# Patient Record
Sex: FEMALE | Race: WHITE | URBAN - METROPOLITAN AREA
[De-identification: names, ages, dates, MRNs, and addresses within clinical notes are randomized per-mention and may not be internally consistent; named-entity substitution may affect disease eponyms.]

---

## 2018-04-24 ENCOUNTER — OUTPATIENT (OUTPATIENT)
Dept: OUTPATIENT SERVICES | Facility: HOSPITAL | Age: 58
LOS: 1 days | End: 2018-04-24
Payer: MEDICAID

## 2018-04-24 VITALS
HEIGHT: 67 IN | SYSTOLIC BLOOD PRESSURE: 127 MMHG | RESPIRATION RATE: 16 BRPM | WEIGHT: 255.96 LBS | DIASTOLIC BLOOD PRESSURE: 46 MMHG | TEMPERATURE: 99 F | HEART RATE: 72 BPM | OXYGEN SATURATION: 95 %

## 2018-04-24 DIAGNOSIS — M17.10 UNILATERAL PRIMARY OSTEOARTHRITIS, UNSPECIFIED KNEE: ICD-10-CM

## 2018-04-24 DIAGNOSIS — M67.40 GANGLION, UNSPECIFIED SITE: Chronic | ICD-10-CM

## 2018-04-24 DIAGNOSIS — M17.12 UNILATERAL PRIMARY OSTEOARTHRITIS, LEFT KNEE: ICD-10-CM

## 2018-04-24 DIAGNOSIS — Z90.49 ACQUIRED ABSENCE OF OTHER SPECIFIED PARTS OF DIGESTIVE TRACT: Chronic | ICD-10-CM

## 2018-04-24 DIAGNOSIS — Z01.818 ENCOUNTER FOR OTHER PREPROCEDURAL EXAMINATION: ICD-10-CM

## 2018-04-24 DIAGNOSIS — Z98.890 OTHER SPECIFIED POSTPROCEDURAL STATES: Chronic | ICD-10-CM

## 2018-04-24 LAB
ANION GAP SERPL CALC-SCNC: 9 MMOL/L — SIGNIFICANT CHANGE UP (ref 5–17)
APPEARANCE UR: CLEAR — SIGNIFICANT CHANGE UP
APTT BLD: 31.2 SEC — SIGNIFICANT CHANGE UP (ref 27.5–37.4)
BILIRUB UR-MCNC: NEGATIVE — SIGNIFICANT CHANGE UP
BUN SERPL-MCNC: 15 MG/DL — SIGNIFICANT CHANGE UP (ref 7–23)
CALCIUM SERPL-MCNC: 9.2 MG/DL — SIGNIFICANT CHANGE UP (ref 8.5–10.1)
CHLORIDE SERPL-SCNC: 108 MMOL/L — SIGNIFICANT CHANGE UP (ref 96–108)
CO2 SERPL-SCNC: 26 MMOL/L — SIGNIFICANT CHANGE UP (ref 22–31)
COLOR SPEC: YELLOW — SIGNIFICANT CHANGE UP
CREAT SERPL-MCNC: 0.43 MG/DL — LOW (ref 0.5–1.3)
DIFF PNL FLD: ABNORMAL
GLUCOSE SERPL-MCNC: 107 MG/DL — HIGH (ref 70–99)
GLUCOSE UR QL: NEGATIVE — SIGNIFICANT CHANGE UP
HCT VFR BLD CALC: 44.4 % — SIGNIFICANT CHANGE UP (ref 34.5–45)
HGB BLD-MCNC: 14.5 G/DL — SIGNIFICANT CHANGE UP (ref 11.5–15.5)
INR BLD: 1.03 RATIO — SIGNIFICANT CHANGE UP (ref 0.88–1.16)
KETONES UR-MCNC: NEGATIVE — SIGNIFICANT CHANGE UP
LEUKOCYTE ESTERASE UR-ACNC: NEGATIVE — SIGNIFICANT CHANGE UP
MCHC RBC-ENTMCNC: 31.1 PG — SIGNIFICANT CHANGE UP (ref 27–34)
MCHC RBC-ENTMCNC: 32.7 GM/DL — SIGNIFICANT CHANGE UP (ref 32–36)
MCV RBC AUTO: 95.3 FL — SIGNIFICANT CHANGE UP (ref 80–100)
MRSA PCR RESULT.: SIGNIFICANT CHANGE UP
NITRITE UR-MCNC: NEGATIVE — SIGNIFICANT CHANGE UP
PH UR: 6.5 — SIGNIFICANT CHANGE UP (ref 5–8)
PLATELET # BLD AUTO: 166 K/UL — SIGNIFICANT CHANGE UP (ref 150–400)
POTASSIUM SERPL-MCNC: 3.8 MMOL/L — SIGNIFICANT CHANGE UP (ref 3.5–5.3)
POTASSIUM SERPL-SCNC: 3.8 MMOL/L — SIGNIFICANT CHANGE UP (ref 3.5–5.3)
PROT UR-MCNC: NEGATIVE — SIGNIFICANT CHANGE UP
PROTHROM AB SERPL-ACNC: 11.2 SEC — SIGNIFICANT CHANGE UP (ref 9.8–12.7)
RBC # BLD: 4.66 M/UL — SIGNIFICANT CHANGE UP (ref 3.8–5.2)
RBC # FLD: 11.6 % — SIGNIFICANT CHANGE UP (ref 10.3–14.5)
S AUREUS DNA NOSE QL NAA+PROBE: SIGNIFICANT CHANGE UP
SODIUM SERPL-SCNC: 143 MMOL/L — SIGNIFICANT CHANGE UP (ref 135–145)
SP GR SPEC: 1.01 — SIGNIFICANT CHANGE UP (ref 1.01–1.02)
UROBILINOGEN FLD QL: NEGATIVE — SIGNIFICANT CHANGE UP
WBC # BLD: 8 K/UL — SIGNIFICANT CHANGE UP (ref 3.8–10.5)
WBC # FLD AUTO: 8 K/UL — SIGNIFICANT CHANGE UP (ref 3.8–10.5)

## 2018-04-24 PROCEDURE — 80048 BASIC METABOLIC PNL TOTAL CA: CPT

## 2018-04-24 PROCEDURE — G0463: CPT

## 2018-04-24 PROCEDURE — 87640 STAPH A DNA AMP PROBE: CPT

## 2018-04-24 PROCEDURE — 86900 BLOOD TYPING SEROLOGIC ABO: CPT

## 2018-04-24 PROCEDURE — 73560 X-RAY EXAM OF KNEE 1 OR 2: CPT | Mod: 26,LT

## 2018-04-24 PROCEDURE — 93005 ELECTROCARDIOGRAM TRACING: CPT

## 2018-04-24 PROCEDURE — 81001 URINALYSIS AUTO W/SCOPE: CPT

## 2018-04-24 PROCEDURE — 85730 THROMBOPLASTIN TIME PARTIAL: CPT

## 2018-04-24 PROCEDURE — 73560 X-RAY EXAM OF KNEE 1 OR 2: CPT

## 2018-04-24 PROCEDURE — 87086 URINE CULTURE/COLONY COUNT: CPT

## 2018-04-24 PROCEDURE — 85027 COMPLETE CBC AUTOMATED: CPT

## 2018-04-24 PROCEDURE — 86850 RBC ANTIBODY SCREEN: CPT

## 2018-04-24 PROCEDURE — 93010 ELECTROCARDIOGRAM REPORT: CPT | Mod: NC

## 2018-04-24 PROCEDURE — 86901 BLOOD TYPING SEROLOGIC RH(D): CPT

## 2018-04-24 PROCEDURE — 85610 PROTHROMBIN TIME: CPT

## 2018-04-24 PROCEDURE — 87641 MR-STAPH DNA AMP PROBE: CPT

## 2018-04-24 RX ORDER — HYDROCODONE BITARTRATE 50 MG/1
0 CAPSULE, EXTENDED RELEASE ORAL
Qty: 0 | Refills: 0 | COMMUNITY

## 2018-04-24 NOTE — H&P PST ADULT - NSANTHOSAYNRD_GEN_A_CORE
No. ELAINE screening performed.  STOP BANG Legend: 0-2 = LOW Risk; 3-4 = INTERMEDIATE Risk; 5-8 = HIGH Risk

## 2018-04-24 NOTE — H&P PST ADULT - PSH
Ganglion cyst  left wrist 1979  History of arthroscopy of both knees  many yrs ago  S/P cholecystectomy

## 2018-04-24 NOTE — H&P PST ADULT - FAMILY HISTORY
Mother  Still living? No  Type 2 diabetes mellitus, Age at diagnosis: Age Unknown     Father  Still living? No  FH: MI (myocardial infarction), Age at diagnosis: Age Unknown

## 2018-04-24 NOTE — H&P PST ADULT - RS GEN PE MLT RESP DETAILS PC
clear to auscultation bilaterally/breath sounds equal/good air movement/airway patent/respirations non-labored/normal

## 2018-04-24 NOTE — H&P PST ADULT - HISTORY OF PRESENT ILLNESS
57 y/o female, PMH of hypothyroidism and arthritis, with c/o of pain in the left knee. It happened all in a sudden while getting out of bed, the knee went out , almost fell. Called ambulance and went to the Er treated for pain, arthroscopy was done in 2012 and in 2013 right knee arthroscopy was done. Pain progressively getting worse,  was treated with hydrocodone for almost a year. Tried to stop, but the pain was worse and the right knee started hurting also, was seen BY Dr Sherwood couple of weeks ago. MRI of the left knee done, Scheduled for left total knee replacement.

## 2018-04-24 NOTE — H&P PST ADULT - ASSESSMENT
57 y/o female, PMH of hypothyroidism and arthritis, with c/o of pain in the left knee., scheduled for left total knee replacement. Pre op testing today. Medical eval advised

## 2018-04-25 LAB
CULTURE RESULTS: NO GROWTH — SIGNIFICANT CHANGE UP
SPECIMEN SOURCE: SIGNIFICANT CHANGE UP

## 2018-05-02 RX ORDER — SODIUM CHLORIDE 9 MG/ML
1000 INJECTION, SOLUTION INTRAVENOUS
Qty: 0 | Refills: 0 | Status: DISCONTINUED | OUTPATIENT
Start: 2018-05-04 | End: 2018-05-05

## 2018-05-03 RX ORDER — BUPIVACAINE 13.3 MG/ML
20 INJECTION, SUSPENSION, LIPOSOMAL INFILTRATION ONCE
Qty: 0 | Refills: 0 | Status: DISCONTINUED | OUTPATIENT
Start: 2018-05-04 | End: 2018-05-07

## 2018-05-03 RX ORDER — VANCOMYCIN HCL 1 G
1750 VIAL (EA) INTRAVENOUS ONCE
Qty: 0 | Refills: 0 | Status: DISCONTINUED | OUTPATIENT
Start: 2018-05-04 | End: 2018-05-07

## 2018-05-04 ENCOUNTER — INPATIENT (INPATIENT)
Facility: HOSPITAL | Age: 58
LOS: 2 days | Discharge: HOME | DRG: 470 | End: 2018-05-07
Attending: ORTHOPAEDIC SURGERY | Admitting: ORTHOPAEDIC SURGERY
Payer: MEDICAID

## 2018-05-04 VITALS
HEART RATE: 72 BPM | SYSTOLIC BLOOD PRESSURE: 105 MMHG | OXYGEN SATURATION: 94 % | TEMPERATURE: 98 F | DIASTOLIC BLOOD PRESSURE: 45 MMHG | RESPIRATION RATE: 16 BRPM

## 2018-05-04 DIAGNOSIS — M67.40 GANGLION, UNSPECIFIED SITE: Chronic | ICD-10-CM

## 2018-05-04 DIAGNOSIS — Z90.49 ACQUIRED ABSENCE OF OTHER SPECIFIED PARTS OF DIGESTIVE TRACT: Chronic | ICD-10-CM

## 2018-05-04 DIAGNOSIS — Z98.890 OTHER SPECIFIED POSTPROCEDURAL STATES: Chronic | ICD-10-CM

## 2018-05-04 DIAGNOSIS — M17.12 UNILATERAL PRIMARY OSTEOARTHRITIS, LEFT KNEE: ICD-10-CM

## 2018-05-04 LAB
ANION GAP SERPL CALC-SCNC: 7 MMOL/L — SIGNIFICANT CHANGE UP (ref 5–17)
BUN SERPL-MCNC: 14 MG/DL — SIGNIFICANT CHANGE UP (ref 7–23)
CALCIUM SERPL-MCNC: 8.8 MG/DL — SIGNIFICANT CHANGE UP (ref 8.5–10.1)
CHLORIDE SERPL-SCNC: 107 MMOL/L — SIGNIFICANT CHANGE UP (ref 96–108)
CO2 SERPL-SCNC: 28 MMOL/L — SIGNIFICANT CHANGE UP (ref 22–31)
CREAT SERPL-MCNC: 0.6 MG/DL — SIGNIFICANT CHANGE UP (ref 0.5–1.3)
GLUCOSE SERPL-MCNC: 125 MG/DL — HIGH (ref 70–99)
HCT VFR BLD CALC: 39.9 % — SIGNIFICANT CHANGE UP (ref 34.5–45)
HGB BLD-MCNC: 13.4 G/DL — SIGNIFICANT CHANGE UP (ref 11.5–15.5)
MCHC RBC-ENTMCNC: 31.3 PG — SIGNIFICANT CHANGE UP (ref 27–34)
MCHC RBC-ENTMCNC: 33.4 GM/DL — SIGNIFICANT CHANGE UP (ref 32–36)
MCV RBC AUTO: 93.6 FL — SIGNIFICANT CHANGE UP (ref 80–100)
PLATELET # BLD AUTO: 135 K/UL — LOW (ref 150–400)
POTASSIUM SERPL-MCNC: 4 MMOL/L — SIGNIFICANT CHANGE UP (ref 3.5–5.3)
POTASSIUM SERPL-SCNC: 4 MMOL/L — SIGNIFICANT CHANGE UP (ref 3.5–5.3)
RBC # BLD: 4.26 M/UL — SIGNIFICANT CHANGE UP (ref 3.8–5.2)
RBC # FLD: 11.8 % — SIGNIFICANT CHANGE UP (ref 10.3–14.5)
SODIUM SERPL-SCNC: 142 MMOL/L — SIGNIFICANT CHANGE UP (ref 135–145)
WBC # BLD: 11.6 K/UL — HIGH (ref 3.8–10.5)
WBC # FLD AUTO: 11.6 K/UL — HIGH (ref 3.8–10.5)

## 2018-05-04 PROCEDURE — 73562 X-RAY EXAM OF KNEE 3: CPT | Mod: 26,LT

## 2018-05-04 PROCEDURE — 88305 TISSUE EXAM BY PATHOLOGIST: CPT | Mod: 26

## 2018-05-04 PROCEDURE — 88311 DECALCIFY TISSUE: CPT | Mod: 26

## 2018-05-04 RX ORDER — METOCLOPRAMIDE HCL 10 MG
5 TABLET ORAL ONCE
Qty: 0 | Refills: 0 | Status: DISCONTINUED | OUTPATIENT
Start: 2018-05-04 | End: 2018-05-04

## 2018-05-04 RX ORDER — DIPHENHYDRAMINE HCL 50 MG
25 CAPSULE ORAL EVERY 4 HOURS
Qty: 0 | Refills: 0 | Status: DISCONTINUED | OUTPATIENT
Start: 2018-05-04 | End: 2018-05-07

## 2018-05-04 RX ORDER — OXYCODONE HYDROCHLORIDE 5 MG/1
5 TABLET ORAL EVERY 4 HOURS
Qty: 0 | Refills: 0 | Status: DISCONTINUED | OUTPATIENT
Start: 2018-05-04 | End: 2018-05-07

## 2018-05-04 RX ORDER — OXYCODONE HYDROCHLORIDE 5 MG/1
10 TABLET ORAL EVERY 4 HOURS
Qty: 0 | Refills: 0 | Status: DISCONTINUED | OUTPATIENT
Start: 2018-05-04 | End: 2018-05-07

## 2018-05-04 RX ORDER — ENOXAPARIN SODIUM 100 MG/ML
30 INJECTION SUBCUTANEOUS EVERY 12 HOURS
Qty: 0 | Refills: 0 | Status: DISCONTINUED | OUTPATIENT
Start: 2018-05-05 | End: 2018-05-07

## 2018-05-04 RX ORDER — BENZOCAINE AND MENTHOL 5; 1 G/100ML; G/100ML
1 LIQUID ORAL
Qty: 0 | Refills: 0 | Status: DISCONTINUED | OUTPATIENT
Start: 2018-05-04 | End: 2018-05-07

## 2018-05-04 RX ORDER — ONDANSETRON 8 MG/1
4 TABLET, FILM COATED ORAL ONCE
Qty: 0 | Refills: 0 | Status: DISCONTINUED | OUTPATIENT
Start: 2018-05-04 | End: 2018-05-04

## 2018-05-04 RX ORDER — DOCUSATE SODIUM 100 MG
100 CAPSULE ORAL THREE TIMES A DAY
Qty: 0 | Refills: 0 | Status: DISCONTINUED | OUTPATIENT
Start: 2018-05-04 | End: 2018-05-07

## 2018-05-04 RX ORDER — HYDROMORPHONE HYDROCHLORIDE 2 MG/ML
0.5 INJECTION INTRAMUSCULAR; INTRAVENOUS; SUBCUTANEOUS
Qty: 0 | Refills: 0 | Status: DISCONTINUED | OUTPATIENT
Start: 2018-05-04 | End: 2018-05-04

## 2018-05-04 RX ORDER — SODIUM CHLORIDE 9 MG/ML
1000 INJECTION, SOLUTION INTRAVENOUS
Qty: 0 | Refills: 0 | Status: DISCONTINUED | OUTPATIENT
Start: 2018-05-04 | End: 2018-05-04

## 2018-05-04 RX ORDER — SENNA PLUS 8.6 MG/1
2 TABLET ORAL AT BEDTIME
Qty: 0 | Refills: 0 | Status: DISCONTINUED | OUTPATIENT
Start: 2018-05-04 | End: 2018-05-07

## 2018-05-04 RX ORDER — ACETAMINOPHEN 500 MG
650 TABLET ORAL EVERY 6 HOURS
Qty: 0 | Refills: 0 | Status: DISCONTINUED | OUTPATIENT
Start: 2018-05-04 | End: 2018-05-07

## 2018-05-04 RX ORDER — ENOXAPARIN SODIUM 100 MG/ML
30 INJECTION SUBCUTANEOUS
Qty: 1800 | Refills: 0 | OUTPATIENT
Start: 2018-05-04 | End: 2018-06-02

## 2018-05-04 RX ORDER — ACETAMINOPHEN 500 MG
650 TABLET ORAL EVERY 6 HOURS
Qty: 0 | Refills: 0 | Status: DISCONTINUED | OUTPATIENT
Start: 2018-05-04 | End: 2018-05-04

## 2018-05-04 RX ORDER — BUPIVACAINE HCL/PF 7.5 MG/ML
400 VIAL (ML) INJECTION
Qty: 0 | Refills: 0 | Status: DISCONTINUED | OUTPATIENT
Start: 2018-05-04 | End: 2018-05-07

## 2018-05-04 RX ORDER — OXYCODONE HYDROCHLORIDE 5 MG/1
5 TABLET ORAL EVERY 4 HOURS
Qty: 0 | Refills: 0 | Status: DISCONTINUED | OUTPATIENT
Start: 2018-05-04 | End: 2018-05-04

## 2018-05-04 RX ORDER — TRAMADOL HYDROCHLORIDE 50 MG/1
50 TABLET ORAL EVERY 8 HOURS
Qty: 0 | Refills: 0 | Status: DISCONTINUED | OUTPATIENT
Start: 2018-05-04 | End: 2018-05-07

## 2018-05-04 RX ORDER — POLYETHYLENE GLYCOL 3350 17 G/17G
17 POWDER, FOR SOLUTION ORAL DAILY
Qty: 0 | Refills: 0 | Status: DISCONTINUED | OUTPATIENT
Start: 2018-05-04 | End: 2018-05-07

## 2018-05-04 RX ORDER — SODIUM CHLORIDE 9 MG/ML
1000 INJECTION, SOLUTION INTRAVENOUS
Qty: 0 | Refills: 0 | Status: DISCONTINUED | OUTPATIENT
Start: 2018-05-04 | End: 2018-05-05

## 2018-05-04 RX ORDER — OXYCODONE HYDROCHLORIDE 5 MG/1
10 TABLET ORAL EVERY 6 HOURS
Qty: 0 | Refills: 0 | Status: DISCONTINUED | OUTPATIENT
Start: 2018-05-04 | End: 2018-05-04

## 2018-05-04 RX ORDER — ONDANSETRON 8 MG/1
4 TABLET, FILM COATED ORAL EVERY 6 HOURS
Qty: 0 | Refills: 0 | Status: DISCONTINUED | OUTPATIENT
Start: 2018-05-04 | End: 2018-05-07

## 2018-05-04 RX ORDER — DIPHENHYDRAMINE HCL 50 MG
25 CAPSULE ORAL AT BEDTIME
Qty: 0 | Refills: 0 | Status: DISCONTINUED | OUTPATIENT
Start: 2018-05-04 | End: 2018-05-07

## 2018-05-04 RX ORDER — LEVOTHYROXINE SODIUM 125 MCG
225 TABLET ORAL DAILY
Qty: 0 | Refills: 0 | Status: DISCONTINUED | OUTPATIENT
Start: 2018-05-04 | End: 2018-05-07

## 2018-05-04 RX ORDER — CYCLOBENZAPRINE HYDROCHLORIDE 10 MG/1
10 TABLET, FILM COATED ORAL DAILY
Qty: 0 | Refills: 0 | Status: DISCONTINUED | OUTPATIENT
Start: 2018-05-04 | End: 2018-05-07

## 2018-05-04 RX ADMIN — OXYCODONE HYDROCHLORIDE 10 MILLIGRAM(S): 5 TABLET ORAL at 16:50

## 2018-05-04 RX ADMIN — HYDROMORPHONE HYDROCHLORIDE 0.5 MILLIGRAM(S): 2 INJECTION INTRAMUSCULAR; INTRAVENOUS; SUBCUTANEOUS at 14:10

## 2018-05-04 RX ADMIN — HYDROMORPHONE HYDROCHLORIDE 0.5 MILLIGRAM(S): 2 INJECTION INTRAMUSCULAR; INTRAVENOUS; SUBCUTANEOUS at 13:32

## 2018-05-04 RX ADMIN — OXYCODONE HYDROCHLORIDE 10 MILLIGRAM(S): 5 TABLET ORAL at 21:47

## 2018-05-04 RX ADMIN — HYDROMORPHONE HYDROCHLORIDE 0.5 MILLIGRAM(S): 2 INJECTION INTRAMUSCULAR; INTRAVENOUS; SUBCUTANEOUS at 13:55

## 2018-05-04 RX ADMIN — SODIUM CHLORIDE 75 MILLILITER(S): 9 INJECTION, SOLUTION INTRAVENOUS at 09:47

## 2018-05-04 RX ADMIN — Medication 100 MILLIGRAM(S): at 17:49

## 2018-05-04 RX ADMIN — OXYCODONE HYDROCHLORIDE 10 MILLIGRAM(S): 5 TABLET ORAL at 16:21

## 2018-05-04 RX ADMIN — BENZOCAINE AND MENTHOL 1 LOZENGE: 5; 1 LIQUID ORAL at 23:55

## 2018-05-04 RX ADMIN — SODIUM CHLORIDE 75 MILLILITER(S): 9 INJECTION, SOLUTION INTRAVENOUS at 13:34

## 2018-05-04 RX ADMIN — HYDROMORPHONE HYDROCHLORIDE 0.5 MILLIGRAM(S): 2 INJECTION INTRAMUSCULAR; INTRAVENOUS; SUBCUTANEOUS at 13:47

## 2018-05-04 RX ADMIN — OXYCODONE HYDROCHLORIDE 10 MILLIGRAM(S): 5 TABLET ORAL at 20:59

## 2018-05-04 RX ADMIN — BENZOCAINE AND MENTHOL 1 LOZENGE: 5; 1 LIQUID ORAL at 17:49

## 2018-05-04 RX ADMIN — CYCLOBENZAPRINE HYDROCHLORIDE 10 MILLIGRAM(S): 10 TABLET, FILM COATED ORAL at 19:19

## 2018-05-04 RX ADMIN — Medication 8 MILLILITER(S): at 13:36

## 2018-05-04 RX ADMIN — Medication 100 MILLIGRAM(S): at 21:45

## 2018-05-04 NOTE — DISCHARGE NOTE ADULT - CARE PROVIDER_API CALL
Esau Sherwood (DO), Orthopaedic Surgery  [address] [place] [address]  Phone: [phone]  Fax: [phone]

## 2018-05-04 NOTE — PHYSICAL THERAPY INITIAL EVALUATION ADULT - PERTINENT HX OF CURRENT PROBLEM, REHAB EVAL
57 y/o female, PMH of hypothyroidism and arthritis, with c/o of pain in the left knee. It happened all in a sudden while getting out of bed, the knee went out , almost fell. Called ambulance and went to the Er treated for pain, arthroscopy was done in 2012 and in 2013 right knee arthroscopy was done.

## 2018-05-04 NOTE — DISCHARGE NOTE ADULT - PLAN OF CARE
Return to baseline ADLs 1. Pain control  2. Walking with full weight bearing as tolerated, with assistive devices (walker/cane) as needed  3. DVT Prophylaxis for 30 days with Lovenox  4. Physical therapy  5. Follow up with Dr. Sherwood as outpatient in 10-14 days after discharge from the hospital or rehab. Call office for appointment.  6. Remove staples/sutures Post-Op Day 14, and remove dressing Post-Op Day 10 with daily dressing changes as needed.  7. Ice affected area as needed.  8. Keep dressing clean and dry.

## 2018-05-04 NOTE — DISCHARGE NOTE ADULT - CARE PLAN
Principal Discharge DX:	S/P total knee arthroplasty  Goal:	Return to baseline ADLs  Assessment and plan of treatment:	1. Pain control  2. Walking with full weight bearing as tolerated, with assistive devices (walker/cane) as needed  3. DVT Prophylaxis for 30 days with Lovenox  4. Physical therapy  5. Follow up with Dr. Sherwood as outpatient in 10-14 days after discharge from the hospital or rehab. Call office for appointment.  6. Remove staples/sutures Post-Op Day 14, and remove dressing Post-Op Day 10 with daily dressing changes as needed.  7. Ice affected area as needed.  8. Keep dressing clean and dry.

## 2018-05-04 NOTE — DISCHARGE NOTE ADULT - INSTRUCTIONS
any calf tenderness; fever or persistent and [facility].

## 2018-05-04 NOTE — DISCHARGE NOTE ADULT - PATIENT PORTAL LINK FT
You can access the [facility] Patient Portal, offered by [facility], by registering with the following website: [email or web address]

## 2018-05-04 NOTE — BRIEF OPERATIVE NOTE - PROCEDURE
<<-----Click on this checkbox to enter Procedure Total knee arthroplasty  05/04/2018    Active  [id number]

## 2018-05-04 NOTE — DISCHARGE NOTE ADULT - HOSPITAL COURSE
The patient is a 58 year old female status post elective total knee Arthroplasty to the left knee after failing outpatient nonoperative conservative managment. Patient presented to [facility] after being medically cleared for an elective surgical procedure. The patient was taken to the operating room on date mentioned above. Prophylactic antibiotics were started before the procedure and continued for 24 hours. There were no complications during the procedure and patient tolerated the procedure well. The patient was transferred to the recovery room in stable condition and subsequently to the surgical floor. The patient was placed on lovenox for anticoagulation. All home medications were continued. The patient received physical therapy daily and daily labs were followed. The dressing was kept clearn, dry, intact, and was changed when appropriate. The rest of the hospital stay was unremarkable.

## 2018-05-04 NOTE — DISCHARGE NOTE ADULT - MEDICATION SUMMARY - MEDICATIONS TO TAKE
I will START or STAY ON the medications listed below when I get home from the hospital:    Rolling Walker  -- Rolling Walker    s/p Total Knee Replacement    ICD 10: Z96.659  -- Indication: For S/P tKA    3-in-1 Commode  -- 3-in-1 Commode    s/p Total Knee Replacement    ICD 10: Z96.659  -- Indication: For S/P tKA    Percocet 5/325 oral tablet  -- 1 tab(s) by mouth every 4 hours, As Needed for pain MDD:6  -- Caution federal law prohibits the transfer of this drug to any person other  than the person for whom it was prescribed.  May cause drowsiness.  Alcohol may intensify this effect.  Use care when operating dangerous machinery.  This prescription cannot be refilled.  This product contains acetaminophen.  Do not use  with any other product containing acetaminophen to prevent possible liver damage.  Using more of this medication than prescribed may cause serious breathing problems.    -- Indication: For Pain    enoxaparin 30 mg/0.3 mL injectable solution  -- 30 milligram(s) injectable 2 times a day for 30 days for DVT ppx, 60 total injections  -- It is very important that you take or use this exactly as directed.  Do not skip doses or discontinue unless directed by your doctor.    -- Indication: For DVT ppx    cyclobenzaprine 10 mg oral tablet  -- 10 mg daily  -- Indication: For Home med    levothyroxine  -- 225 mcg daily  -- Indication: For Home med

## 2018-05-04 NOTE — DISCHARGE NOTE ADULT - NSTOBACCOHOTLINE_GEN_A_CS
[facility] Smokers Quitline ([phone]) [facility] Smokers Quitline ([phone])

## 2018-05-04 NOTE — PROGRESS NOTE ADULT - SUBJECTIVE AND OBJECTIVE BOX
Ortho Post Op Check     Patient seen and examined. Pain controlled. Tolerated procedure well.       MEDICATIONS  (STANDING):  BUpivacaine 0.375% On-Q Pump 400 milliLiter(s) IntraDermal. <Continuous>  BUpivacaine liposome 1.3% Injectable 20 milliLiter(s) Local Injection once  clindamycin IVPB 900 milliGRAM(s) IV Intermittent every 8 hours  lactated ringers. 1000 milliLiter(s) IV Continuous <Continuous>  lactated ringers. 1000 milliLiter(s) IV Continuous <Continuous>  vancomycin  IVPB 1750 milliGRAM(s) IV Intermittent once    Allergies    codeine (Rash; Urticaria; Vomiting)  penicillin (Rash)    Intolerances          Vital Signs Last 24 Hrs  T(C): 36.5 (05-04-18 @ 12:49), Max: 36.7 (05-04-18 @ 09:25)  T(F): 97.7 (05-04-18 @ 12:49), Max: 98.1 (05-04-18 @ 09:25)  HR: 66 (05-04-18 @ 13:15) (66 - 82)  BP: 123/53 (05-04-18 @ 13:15) (105/45 - 130/57)  RR: 19 (05-04-18 @ 13:15) (16 - 20)  SpO2: 98% (05-04-18 @ 13:15) (94% - 98%)    Physical Exam  Gen: NAD  LLE:   Dressing c/d/i  + On q pump  +ehl/fhl/ta/gs function  L2-S1 silt  Dp/pt pulse intact  No calf ttp  Compartments soft    A/P: 58y Female sp L TKA POD 0   Pain control  DVT ppx  PT/WBAT/OOB  FU labs  Ice/elevate  Incentive spirometry  FU Labs  FU Transfer to floor   Dispo planning

## 2018-05-05 LAB
ANION GAP SERPL CALC-SCNC: 8 MMOL/L — SIGNIFICANT CHANGE UP (ref 5–17)
BUN SERPL-MCNC: 13 MG/DL — SIGNIFICANT CHANGE UP (ref 7–23)
CALCIUM SERPL-MCNC: 8.4 MG/DL — LOW (ref 8.5–10.1)
CHLORIDE SERPL-SCNC: 109 MMOL/L — HIGH (ref 96–108)
CO2 SERPL-SCNC: 26 MMOL/L — SIGNIFICANT CHANGE UP (ref 22–31)
CREAT SERPL-MCNC: 0.57 MG/DL — SIGNIFICANT CHANGE UP (ref 0.5–1.3)
GLUCOSE SERPL-MCNC: 135 MG/DL — HIGH (ref 70–99)
HCT VFR BLD CALC: 33.8 % — LOW (ref 34.5–45)
HGB BLD-MCNC: 11.1 G/DL — LOW (ref 11.5–15.5)
MCHC RBC-ENTMCNC: 30.3 PG — SIGNIFICANT CHANGE UP (ref 27–34)
MCHC RBC-ENTMCNC: 32.9 GM/DL — SIGNIFICANT CHANGE UP (ref 32–36)
MCV RBC AUTO: 92.2 FL — SIGNIFICANT CHANGE UP (ref 80–100)
PLATELET # BLD AUTO: 144 K/UL — LOW (ref 150–400)
POTASSIUM SERPL-MCNC: 3.8 MMOL/L — SIGNIFICANT CHANGE UP (ref 3.5–5.3)
POTASSIUM SERPL-SCNC: 3.8 MMOL/L — SIGNIFICANT CHANGE UP (ref 3.5–5.3)
RBC # BLD: 3.67 M/UL — LOW (ref 3.8–5.2)
RBC # FLD: 11.3 % — SIGNIFICANT CHANGE UP (ref 10.3–14.5)
SODIUM SERPL-SCNC: 143 MMOL/L — SIGNIFICANT CHANGE UP (ref 135–145)
WBC # BLD: 11.6 K/UL — HIGH (ref 3.8–10.5)
WBC # FLD AUTO: 11.6 K/UL — HIGH (ref 3.8–10.5)

## 2018-05-05 RX ADMIN — POLYETHYLENE GLYCOL 3350 17 GRAM(S): 17 POWDER, FOR SOLUTION ORAL at 11:32

## 2018-05-05 RX ADMIN — OXYCODONE HYDROCHLORIDE 10 MILLIGRAM(S): 5 TABLET ORAL at 12:47

## 2018-05-05 RX ADMIN — Medication 100 MILLIGRAM(S): at 13:57

## 2018-05-05 RX ADMIN — OXYCODONE HYDROCHLORIDE 10 MILLIGRAM(S): 5 TABLET ORAL at 16:35

## 2018-05-05 RX ADMIN — Medication 225 MICROGRAM(S): at 05:08

## 2018-05-05 RX ADMIN — Medication 100 MILLIGRAM(S): at 21:38

## 2018-05-05 RX ADMIN — Medication 1 TABLET(S): at 11:32

## 2018-05-05 RX ADMIN — OXYCODONE HYDROCHLORIDE 10 MILLIGRAM(S): 5 TABLET ORAL at 17:44

## 2018-05-05 RX ADMIN — ENOXAPARIN SODIUM 30 MILLIGRAM(S): 100 INJECTION SUBCUTANEOUS at 05:07

## 2018-05-05 RX ADMIN — ENOXAPARIN SODIUM 30 MILLIGRAM(S): 100 INJECTION SUBCUTANEOUS at 17:45

## 2018-05-05 RX ADMIN — OXYCODONE HYDROCHLORIDE 10 MILLIGRAM(S): 5 TABLET ORAL at 08:42

## 2018-05-05 RX ADMIN — OXYCODONE HYDROCHLORIDE 10 MILLIGRAM(S): 5 TABLET ORAL at 03:00

## 2018-05-05 RX ADMIN — OXYCODONE HYDROCHLORIDE 10 MILLIGRAM(S): 5 TABLET ORAL at 02:10

## 2018-05-05 RX ADMIN — Medication 100 MILLIGRAM(S): at 02:03

## 2018-05-05 RX ADMIN — Medication 100 MILLIGRAM(S): at 05:07

## 2018-05-05 RX ADMIN — OXYCODONE HYDROCHLORIDE 10 MILLIGRAM(S): 5 TABLET ORAL at 22:40

## 2018-05-05 RX ADMIN — OXYCODONE HYDROCHLORIDE 10 MILLIGRAM(S): 5 TABLET ORAL at 21:38

## 2018-05-05 NOTE — PROGRESS NOTE ADULT - SUBJECTIVE AND OBJECTIVE BOX
DEPARTMENT OF ANESTHESIA  POST ANESTHETIC EVALUATION    The Patient was interviewed and evaluated.  The patient is S/P a left total knee arthroplasty    Vital Signs Last 24 Hrs  T(C): 37.5 (05 May 2018 08:46), Max: 37.5 (04 May 2018 23:49)  T(F): 99.5 (05 May 2018 08:46), Max: 99.5 (04 May 2018 23:49)  HR: 89 (05 May 2018 08:46) (63 - 90)  BP: 113/68 (05 May 2018 08:46) (103/51 - 130/57)  BP(mean): --  RR: 18 (05 May 2018 08:46) (15 - 20)  SpO2: 96% (05 May 2018 08:46) (93% - 99%)    Evaluation:  The patient is doing well.     (x ) No apparent complications or complaints regarding anesthesia care at this time  (x ) All questions were answered    Condition:  (x ) Stable      ( ) Guarded      ( ) Critical    Recommendations:  (x None     ( ) Other:

## 2018-05-05 NOTE — PROGRESS NOTE ADULT - SUBJECTIVE AND OBJECTIVE BOX
Pt S/E at bedside, no acute events overnight, pain controlled. No complaints this AM.    Vital Signs Last 24 Hrs  T(C): 36.9 (05 May 2018 04:28), Max: 37.5 (04 May 2018 23:49)  T(F): 98.5 (05 May 2018 04:28), Max: 99.5 (04 May 2018 23:49)  HR: 80 (05 May 2018 04:28) (63 - 90)  BP: 113/66 (05 May 2018 04:28) (103/51 - 130/57)  BP(mean): --  RR: 16 (05 May 2018 04:28) (15 - 20)  SpO2: 97% (05 May 2018 04:28) (93% - 99%)    Gen: NAD, AAOx3    Left Lower Extremity:  Dressing clean dry intact, +OnQ  +EHL/FHL/TA/GS  SILT L3-S1  +DP/PT Pulses  Compartments soft  No calf TTP B/L

## 2018-05-05 NOTE — PROGRESS NOTE ADULT - SUBJECTIVE AND OBJECTIVE BOX
Post Op Day __1_ of Anesthesia Pain Management Service    SUBJECTIVE:  The patient is doing well   		  OBJECTIVE:   Pain Score:  5 /10  Therapy:	[ ] IV PCA	[ ] Epidural   [ ] s/p Spinal Opioid	[x] Peripheral nerve block  	  Vital Signs Last 24 Hrs  T(C): 37.5 (05 May 2018 08:46), Max: 37.5 (04 May 2018 23:49)  T(F): 99.5 (05 May 2018 08:46), Max: 99.5 (04 May 2018 23:49)  HR: 89 (05 May 2018 08:46) (63 - 90)  BP: 113/68 (05 May 2018 08:46) (103/51 - 130/57)  BP(mean): --  RR: 18 (05 May 2018 08:46) (15 - 20)  SpO2: 96% (05 May 2018 08:46) (93% - 99%)    (x ) Alert & Oriented     (x) No motor/sensory block     ( ) Nausea     ( ) Pruritis     ( ) Headache    ( x) Catheter Site Unremarkable    Assessment of Catheter Site:	[x ] Intact		[ ] Other:    ( ) Further Pain Rx Plan:  Oral Pain Medications    COMMENTS:  patient remains on On Q pump, will continue to follow      ANTICOAGULATION STATUS:

## 2018-05-06 PROCEDURE — 93971 EXTREMITY STUDY: CPT | Mod: 26,LT

## 2018-05-06 RX ADMIN — Medication 100 MILLIGRAM(S): at 05:02

## 2018-05-06 RX ADMIN — OXYCODONE HYDROCHLORIDE 10 MILLIGRAM(S): 5 TABLET ORAL at 23:41

## 2018-05-06 RX ADMIN — OXYCODONE HYDROCHLORIDE 10 MILLIGRAM(S): 5 TABLET ORAL at 08:58

## 2018-05-06 RX ADMIN — Medication 225 MICROGRAM(S): at 05:03

## 2018-05-06 RX ADMIN — OXYCODONE HYDROCHLORIDE 10 MILLIGRAM(S): 5 TABLET ORAL at 15:41

## 2018-05-06 RX ADMIN — Medication 100 MILLIGRAM(S): at 14:18

## 2018-05-06 RX ADMIN — Medication 1 TABLET(S): at 12:39

## 2018-05-06 RX ADMIN — Medication 100 MILLIGRAM(S): at 21:05

## 2018-05-06 RX ADMIN — OXYCODONE HYDROCHLORIDE 10 MILLIGRAM(S): 5 TABLET ORAL at 19:34

## 2018-05-06 RX ADMIN — ENOXAPARIN SODIUM 30 MILLIGRAM(S): 100 INJECTION SUBCUTANEOUS at 05:02

## 2018-05-06 RX ADMIN — ENOXAPARIN SODIUM 30 MILLIGRAM(S): 100 INJECTION SUBCUTANEOUS at 17:57

## 2018-05-06 RX ADMIN — POLYETHYLENE GLYCOL 3350 17 GRAM(S): 17 POWDER, FOR SOLUTION ORAL at 12:39

## 2018-05-06 RX ADMIN — OXYCODONE HYDROCHLORIDE 10 MILLIGRAM(S): 5 TABLET ORAL at 06:30

## 2018-05-06 RX ADMIN — OXYCODONE HYDROCHLORIDE 10 MILLIGRAM(S): 5 TABLET ORAL at 04:57

## 2018-05-06 RX ADMIN — OXYCODONE HYDROCHLORIDE 10 MILLIGRAM(S): 5 TABLET ORAL at 20:00

## 2018-05-06 RX ADMIN — OXYCODONE HYDROCHLORIDE 10 MILLIGRAM(S): 5 TABLET ORAL at 09:36

## 2018-05-06 RX ADMIN — OXYCODONE HYDROCHLORIDE 10 MILLIGRAM(S): 5 TABLET ORAL at 14:21

## 2018-05-06 NOTE — PROGRESS NOTE ADULT - SUBJECTIVE AND OBJECTIVE BOX
58y Female pod 2    T(C): 37.1 (05-06-18 @ 04:17), Max: 37.7 (05-05-18 @ 16:07)  HR: 88 (05-06-18 @ 04:17) (88 - 97)  BP: 122/72 (05-06-18 @ 04:17) (114/66 - 136/73)  RR: 16 (05-06-18 @ 04:17) (16 - 18)  SpO2: 97% (05-06-18 @ 04:17) (94% - 97%)  Wt(kg): --    Pt seen, doing well, no anesthesia complications or complaints noted or reported.   No Nausea  Pain well controlled with pain pump

## 2018-05-06 NOTE — PROGRESS NOTE ADULT - SUBJECTIVE AND OBJECTIVE BOX
Pt S/E at bedside, no acute events overnight, pain controlled. No complaints this AM.    Vital Signs Last 24 Hrs  T(C): 37.1 (06 May 2018 04:17), Max: 37.7 (05 May 2018 16:07)  T(F): 98.8 (06 May 2018 04:17), Max: 99.8 (05 May 2018 16:07)  HR: 88 (06 May 2018 04:17) (88 - 97)  BP: 122/72 (06 May 2018 04:17) (113/68 - 136/73)  BP(mean): --  RR: 16 (06 May 2018 04:17) (16 - 18)  SpO2: 97% (06 May 2018 04:17) (94% - 97%)    Gen: NAD, AAOx3    Left Lower Extremity:  Dressing clean dry intact, +OnQ  +EHL/FHL/TA/GS  SILT L3-S1  +DP/PT Pulses  Compartments soft  No calf TTP B/L

## 2018-05-07 VITALS
DIASTOLIC BLOOD PRESSURE: 62 MMHG | RESPIRATION RATE: 17 BRPM | TEMPERATURE: 99 F | HEART RATE: 84 BPM | OXYGEN SATURATION: 98 % | SYSTOLIC BLOOD PRESSURE: 99 MMHG

## 2018-05-07 LAB
HCT VFR BLD CALC: 29.1 % — LOW (ref 34.5–45)
HGB BLD-MCNC: 9.6 G/DL — LOW (ref 11.5–15.5)
MCHC RBC-ENTMCNC: 30.3 PG — SIGNIFICANT CHANGE UP (ref 27–34)
MCHC RBC-ENTMCNC: 32.9 GM/DL — SIGNIFICANT CHANGE UP (ref 32–36)
MCV RBC AUTO: 92 FL — SIGNIFICANT CHANGE UP (ref 80–100)
PLATELET # BLD AUTO: 129 K/UL — LOW (ref 150–400)
RBC # BLD: 3.17 M/UL — LOW (ref 3.8–5.2)
RBC # FLD: 11.3 % — SIGNIFICANT CHANGE UP (ref 10.3–14.5)
WBC # BLD: 11.8 K/UL — HIGH (ref 3.8–10.5)
WBC # FLD AUTO: 11.8 K/UL — HIGH (ref 3.8–10.5)

## 2018-05-07 RX ADMIN — OXYCODONE HYDROCHLORIDE 10 MILLIGRAM(S): 5 TABLET ORAL at 04:06

## 2018-05-07 RX ADMIN — Medication 100 MILLIGRAM(S): at 05:25

## 2018-05-07 RX ADMIN — OXYCODONE HYDROCHLORIDE 10 MILLIGRAM(S): 5 TABLET ORAL at 09:44

## 2018-05-07 RX ADMIN — Medication 225 MICROGRAM(S): at 05:25

## 2018-05-07 RX ADMIN — Medication 1 TABLET(S): at 11:10

## 2018-05-07 RX ADMIN — OXYCODONE HYDROCHLORIDE 10 MILLIGRAM(S): 5 TABLET ORAL at 12:07

## 2018-05-07 RX ADMIN — OXYCODONE HYDROCHLORIDE 10 MILLIGRAM(S): 5 TABLET ORAL at 13:39

## 2018-05-07 RX ADMIN — ENOXAPARIN SODIUM 30 MILLIGRAM(S): 100 INJECTION SUBCUTANEOUS at 05:25

## 2018-05-07 RX ADMIN — POLYETHYLENE GLYCOL 3350 17 GRAM(S): 17 POWDER, FOR SOLUTION ORAL at 11:10

## 2018-05-07 RX ADMIN — OXYCODONE HYDROCHLORIDE 10 MILLIGRAM(S): 5 TABLET ORAL at 00:10

## 2018-05-07 RX ADMIN — OXYCODONE HYDROCHLORIDE 10 MILLIGRAM(S): 5 TABLET ORAL at 08:11

## 2018-05-07 NOTE — PROGRESS NOTE ADULT - ASSESSMENT
58F s/p L TKA POD 3  Analgesia  DVT ppx  WBAT LLE  PT/OT  Incentive spirometry  DC planning, home today

## 2018-05-07 NOTE — PROGRESS NOTE ADULT - SUBJECTIVE AND OBJECTIVE BOX
Pt S/E at bedside, no acute events overnight, pain controlled    Vital Signs Last 24 Hrs  T(C): 36.9 (07 May 2018 00:50), Max: 37.7 (06 May 2018 21:15)  T(F): 98.4 (07 May 2018 00:50), Max: 99.8 (06 May 2018 21:15)  HR: 93 (07 May 2018 00:50) (93 - 99)  BP: 121/73 (07 May 2018 00:50) (117/80 - 121/73)  BP(mean): --  RR: 16 (07 May 2018 00:50) (16 - 17)  SpO2: 98% (07 May 2018 00:50) (94% - 98%)    Gen: NAD,     Left Lower Extremity:  Dressing removed, onQ removed, incision well appearing no erythema or drainage noted  +EHL/FHL/TA/GS  SILT L3-S1  +DP/PT Pulses  Compartments soft  No calf TTP B/L

## 2018-05-23 PROCEDURE — C1776: CPT

## 2018-05-23 PROCEDURE — 97530 THERAPEUTIC ACTIVITIES: CPT

## 2018-05-23 PROCEDURE — 97110 THERAPEUTIC EXERCISES: CPT

## 2018-05-23 PROCEDURE — 88305 TISSUE EXAM BY PATHOLOGIST: CPT

## 2018-05-23 PROCEDURE — 97116 GAIT TRAINING THERAPY: CPT

## 2018-05-23 PROCEDURE — 36415 COLL VENOUS BLD VENIPUNCTURE: CPT

## 2018-05-23 PROCEDURE — 97162 PT EVAL MOD COMPLEX 30 MIN: CPT

## 2018-05-23 PROCEDURE — 88311 DECALCIFY TISSUE: CPT

## 2018-05-23 PROCEDURE — 80048 BASIC METABOLIC PNL TOTAL CA: CPT

## 2018-05-23 PROCEDURE — 85027 COMPLETE CBC AUTOMATED: CPT

## 2018-05-23 PROCEDURE — 73562 X-RAY EXAM OF KNEE 3: CPT

## 2018-05-23 PROCEDURE — C1713: CPT

## 2018-05-23 PROCEDURE — 93971 EXTREMITY STUDY: CPT

## 2018-06-27 ENCOUNTER — OUTPATIENT (OUTPATIENT)
Dept: OUTPATIENT SERVICES | Facility: HOSPITAL | Age: 58
LOS: 1 days | End: 2018-06-27
Payer: MEDICAID

## 2018-06-27 VITALS
RESPIRATION RATE: 16 BRPM | WEIGHT: 251.11 LBS | DIASTOLIC BLOOD PRESSURE: 53 MMHG | HEART RATE: 74 BPM | HEIGHT: 67 IN | SYSTOLIC BLOOD PRESSURE: 126 MMHG | TEMPERATURE: 97 F

## 2018-06-27 DIAGNOSIS — Z01.818 ENCOUNTER FOR OTHER PREPROCEDURAL EXAMINATION: ICD-10-CM

## 2018-06-27 DIAGNOSIS — M17.11 UNILATERAL PRIMARY OSTEOARTHRITIS, RIGHT KNEE: ICD-10-CM

## 2018-06-27 DIAGNOSIS — M67.40 GANGLION, UNSPECIFIED SITE: Chronic | ICD-10-CM

## 2018-06-27 DIAGNOSIS — Z96.652 PRESENCE OF LEFT ARTIFICIAL KNEE JOINT: Chronic | ICD-10-CM

## 2018-06-27 DIAGNOSIS — Z98.890 OTHER SPECIFIED POSTPROCEDURAL STATES: Chronic | ICD-10-CM

## 2018-06-27 DIAGNOSIS — Z90.49 ACQUIRED ABSENCE OF OTHER SPECIFIED PARTS OF DIGESTIVE TRACT: Chronic | ICD-10-CM

## 2018-06-27 LAB
ALBUMIN SERPL ELPH-MCNC: 4 G/DL — SIGNIFICANT CHANGE UP (ref 3.3–5)
ALP SERPL-CCNC: 94 U/L — SIGNIFICANT CHANGE UP (ref 40–120)
ALT FLD-CCNC: 24 U/L — SIGNIFICANT CHANGE UP (ref 12–78)
ANION GAP SERPL CALC-SCNC: 6 MMOL/L — SIGNIFICANT CHANGE UP (ref 5–17)
APPEARANCE UR: CLEAR — SIGNIFICANT CHANGE UP
APTT BLD: 30.3 SEC — SIGNIFICANT CHANGE UP (ref 27.5–37.4)
AST SERPL-CCNC: 18 U/L — SIGNIFICANT CHANGE UP (ref 15–37)
BILIRUB SERPL-MCNC: 0.2 MG/DL — SIGNIFICANT CHANGE UP (ref 0.2–1.2)
BILIRUB UR-MCNC: NEGATIVE — SIGNIFICANT CHANGE UP
BUN SERPL-MCNC: 13 MG/DL — SIGNIFICANT CHANGE UP (ref 7–23)
CALCIUM SERPL-MCNC: 9.2 MG/DL — SIGNIFICANT CHANGE UP (ref 8.5–10.1)
CHLORIDE SERPL-SCNC: 109 MMOL/L — HIGH (ref 96–108)
CO2 SERPL-SCNC: 29 MMOL/L — SIGNIFICANT CHANGE UP (ref 22–31)
COLOR SPEC: YELLOW — SIGNIFICANT CHANGE UP
CREAT SERPL-MCNC: 0.53 MG/DL — SIGNIFICANT CHANGE UP (ref 0.5–1.3)
DIFF PNL FLD: NEGATIVE — SIGNIFICANT CHANGE UP
GLUCOSE SERPL-MCNC: 103 MG/DL — HIGH (ref 70–99)
GLUCOSE UR QL: NEGATIVE — SIGNIFICANT CHANGE UP
HCT VFR BLD CALC: 43.2 % — SIGNIFICANT CHANGE UP (ref 34.5–45)
HGB BLD-MCNC: 13.8 G/DL — SIGNIFICANT CHANGE UP (ref 11.5–15.5)
INR BLD: 1.02 RATIO — SIGNIFICANT CHANGE UP (ref 0.88–1.16)
KETONES UR-MCNC: NEGATIVE — SIGNIFICANT CHANGE UP
LEUKOCYTE ESTERASE UR-ACNC: NEGATIVE — SIGNIFICANT CHANGE UP
MCHC RBC-ENTMCNC: 29.5 PG — SIGNIFICANT CHANGE UP (ref 27–34)
MCHC RBC-ENTMCNC: 31.9 GM/DL — LOW (ref 32–36)
MCV RBC AUTO: 92.3 FL — SIGNIFICANT CHANGE UP (ref 80–100)
NITRITE UR-MCNC: NEGATIVE — SIGNIFICANT CHANGE UP
NRBC # BLD: 0 /100 WBCS — SIGNIFICANT CHANGE UP (ref 0–0)
PH UR: 6 — SIGNIFICANT CHANGE UP (ref 5–8)
PLATELET # BLD AUTO: 174 K/UL — SIGNIFICANT CHANGE UP (ref 150–400)
POTASSIUM SERPL-MCNC: 3.9 MMOL/L — SIGNIFICANT CHANGE UP (ref 3.5–5.3)
POTASSIUM SERPL-SCNC: 3.9 MMOL/L — SIGNIFICANT CHANGE UP (ref 3.5–5.3)
PROT SERPL-MCNC: 7.4 G/DL — SIGNIFICANT CHANGE UP (ref 6–8.3)
PROT UR-MCNC: NEGATIVE — SIGNIFICANT CHANGE UP
PROTHROM AB SERPL-ACNC: 11.1 SEC — SIGNIFICANT CHANGE UP (ref 9.8–12.7)
RBC # BLD: 4.68 M/UL — SIGNIFICANT CHANGE UP (ref 3.8–5.2)
RBC # FLD: 13.4 % — SIGNIFICANT CHANGE UP (ref 10.3–14.5)
SODIUM SERPL-SCNC: 144 MMOL/L — SIGNIFICANT CHANGE UP (ref 135–145)
SP GR SPEC: 1.02 — SIGNIFICANT CHANGE UP (ref 1.01–1.02)
UROBILINOGEN FLD QL: NEGATIVE — SIGNIFICANT CHANGE UP
WBC # BLD: 7.55 K/UL — SIGNIFICANT CHANGE UP (ref 3.8–10.5)
WBC # FLD AUTO: 7.55 K/UL — SIGNIFICANT CHANGE UP (ref 3.8–10.5)

## 2018-06-27 PROCEDURE — 85730 THROMBOPLASTIN TIME PARTIAL: CPT

## 2018-06-27 PROCEDURE — 73560 X-RAY EXAM OF KNEE 1 OR 2: CPT

## 2018-06-27 PROCEDURE — 87640 STAPH A DNA AMP PROBE: CPT

## 2018-06-27 PROCEDURE — 86901 BLOOD TYPING SEROLOGIC RH(D): CPT

## 2018-06-27 PROCEDURE — G0463: CPT

## 2018-06-27 PROCEDURE — 87086 URINE CULTURE/COLONY COUNT: CPT

## 2018-06-27 PROCEDURE — 86850 RBC ANTIBODY SCREEN: CPT

## 2018-06-27 PROCEDURE — 85610 PROTHROMBIN TIME: CPT

## 2018-06-27 PROCEDURE — 73560 X-RAY EXAM OF KNEE 1 OR 2: CPT | Mod: 26,RT

## 2018-06-27 PROCEDURE — 86900 BLOOD TYPING SEROLOGIC ABO: CPT

## 2018-06-27 PROCEDURE — 85027 COMPLETE CBC AUTOMATED: CPT

## 2018-06-27 PROCEDURE — 81003 URINALYSIS AUTO W/O SCOPE: CPT

## 2018-06-27 PROCEDURE — 80053 COMPREHEN METABOLIC PANEL: CPT

## 2018-06-27 RX ORDER — CYCLOBENZAPRINE HYDROCHLORIDE 10 MG/1
0 TABLET, FILM COATED ORAL
Qty: 0 | Refills: 0 | COMMUNITY

## 2018-06-27 NOTE — H&P PST ADULT - HISTORY OF PRESENT ILLNESS
57 yo obese female presents to [facility] with pain and limited ROM of the right knee secondary to bone on bone OA. Denies pain today. Uses a cane for stability. S/P left knee replacement on 5/4 with Dr. Montgomery. Denies post op complications. Now scheduled for a right total knee replacement  on 7/6 with Dr. Martinez.

## 2018-06-27 NOTE — H&P PST ADULT - PSH
Ganglion cyst  left wrist 1979  History of arthroscopy of both knees  many yrs ago  S/P cholecystectomy    Status post left knee replacement  5/4/2018

## 2018-06-27 NOTE — H&P PST ADULT - PMH
Chronic pain of right knee    Hypothyroidism    Obesity (BMI 30-39.9)    Personal history of arthritis    Primary osteoarthritis of right knee

## 2018-06-27 NOTE — H&P PST ADULT - PROBLEM SELECTOR PLAN 2
Labs-CBC, CMP, PT/PTT, T&S, Nose cx, UA, C/S and EKG, right knee X-ray     Pre op and 3 day of Hibiclens instructions reviewed and given. Take routine am Levothyroxine DOS with sip of water. Avoid NSAIDs and OTC supplements. Verbalized understanding

## 2018-06-27 NOTE — H&P PST ADULT - ASSESSMENT
59 yo female with OA of the left knee scheduled for a right total knee replacement  on 7/6 with Dr. Martinez.

## 2018-06-28 LAB
CULTURE RESULTS: NO GROWTH — SIGNIFICANT CHANGE UP
MRSA PCR RESULT.: SIGNIFICANT CHANGE UP
S AUREUS DNA NOSE QL NAA+PROBE: SIGNIFICANT CHANGE UP
SPECIMEN SOURCE: SIGNIFICANT CHANGE UP

## 2018-08-15 PROBLEM — E03.9 HYPOTHYROIDISM, UNSPECIFIED: Chronic | Status: ACTIVE | Noted: 2018-04-24

## 2018-08-15 PROBLEM — Z87.39 PERSONAL HISTORY OF OTHER DISEASES OF THE MUSCULOSKELETAL SYSTEM AND CONNECTIVE TISSUE: Chronic | Status: ACTIVE | Noted: 2018-04-24

## 2018-08-15 PROBLEM — E66.9 OBESITY, UNSPECIFIED: Chronic | Status: ACTIVE | Noted: 2018-06-27

## 2018-08-15 PROBLEM — M25.561 PAIN IN RIGHT KNEE: Chronic | Status: ACTIVE | Noted: 2018-06-27

## 2018-08-15 PROBLEM — M17.11 UNILATERAL PRIMARY OSTEOARTHRITIS, RIGHT KNEE: Chronic | Status: ACTIVE | Noted: 2018-06-27

## 2018-08-21 ENCOUNTER — OUTPATIENT (OUTPATIENT)
Dept: OUTPATIENT SERVICES | Facility: HOSPITAL | Age: 58
LOS: 1 days | End: 2018-08-21
Payer: MEDICAID

## 2018-08-21 VITALS
HEART RATE: 73 BPM | WEIGHT: 250 LBS | TEMPERATURE: 98 F | DIASTOLIC BLOOD PRESSURE: 54 MMHG | SYSTOLIC BLOOD PRESSURE: 116 MMHG | HEIGHT: 67 IN | RESPIRATION RATE: 16 BRPM | OXYGEN SATURATION: 96 %

## 2018-08-21 DIAGNOSIS — Z90.49 ACQUIRED ABSENCE OF OTHER SPECIFIED PARTS OF DIGESTIVE TRACT: Chronic | ICD-10-CM

## 2018-08-21 DIAGNOSIS — Z01.818 ENCOUNTER FOR OTHER PREPROCEDURAL EXAMINATION: ICD-10-CM

## 2018-08-21 DIAGNOSIS — M17.11 UNILATERAL PRIMARY OSTEOARTHRITIS, RIGHT KNEE: ICD-10-CM

## 2018-08-21 DIAGNOSIS — M67.40 GANGLION, UNSPECIFIED SITE: Chronic | ICD-10-CM

## 2018-08-21 DIAGNOSIS — Z98.890 OTHER SPECIFIED POSTPROCEDURAL STATES: Chronic | ICD-10-CM

## 2018-08-21 DIAGNOSIS — Z96.652 PRESENCE OF LEFT ARTIFICIAL KNEE JOINT: Chronic | ICD-10-CM

## 2018-08-21 LAB
HBA1C BLD-MCNC: 5.9 % — HIGH (ref 4–5.6)
HCT VFR BLD CALC: 44.3 % — SIGNIFICANT CHANGE UP (ref 34.5–45)
HGB BLD-MCNC: 14.2 G/DL — SIGNIFICANT CHANGE UP (ref 11.5–15.5)
MCHC RBC-ENTMCNC: 28.7 PG — SIGNIFICANT CHANGE UP (ref 27–34)
MCHC RBC-ENTMCNC: 32.1 GM/DL — SIGNIFICANT CHANGE UP (ref 32–36)
MCV RBC AUTO: 89.5 FL — SIGNIFICANT CHANGE UP (ref 80–100)
NRBC # BLD: 0 /100 WBCS — SIGNIFICANT CHANGE UP (ref 0–0)
PLATELET # BLD AUTO: 179 K/UL — SIGNIFICANT CHANGE UP (ref 150–400)
RBC # BLD: 4.95 M/UL — SIGNIFICANT CHANGE UP (ref 3.8–5.2)
RBC # FLD: 14 % — SIGNIFICANT CHANGE UP (ref 10.3–14.5)
WBC # BLD: 8.13 K/UL — SIGNIFICANT CHANGE UP (ref 3.8–10.5)
WBC # FLD AUTO: 8.13 K/UL — SIGNIFICANT CHANGE UP (ref 3.8–10.5)

## 2018-08-21 PROCEDURE — 83036 HEMOGLOBIN GLYCOSYLATED A1C: CPT

## 2018-08-21 PROCEDURE — 86901 BLOOD TYPING SEROLOGIC RH(D): CPT

## 2018-08-21 PROCEDURE — 86850 RBC ANTIBODY SCREEN: CPT

## 2018-08-21 PROCEDURE — 36415 COLL VENOUS BLD VENIPUNCTURE: CPT

## 2018-08-21 PROCEDURE — 85027 COMPLETE CBC AUTOMATED: CPT

## 2018-08-21 PROCEDURE — G0463: CPT

## 2018-08-21 PROCEDURE — 86900 BLOOD TYPING SEROLOGIC ABO: CPT

## 2018-08-21 NOTE — H&P PST ADULT - PROBLEM SELECTOR PLAN 1
scheduled for a right total knee replacement  on 7/6 with Dr. Martinez. scheduled for a right total knee replacement  on 9/7 with Dr. Martinez.

## 2018-08-21 NOTE — H&P PST ADULT - HISTORY OF PRESENT ILLNESS
57 yo obese female presents to [facility] with pain and limited ROM of the right knee secondary to bone on bone OA. Denies pain today. Uses a cane for stability. S/P left knee replacement on 5/4 with Dr. Montgomery. Denies post op complications. Now scheduled for a right total knee replacement  on 7/6 with Dr. Martinez.     History noted above. Patient previously cancelled secondary to insurance issues. Returns to PST rescheduled for a right total knee replacement on 9/7 with Dr. Martinez. Denies changes in health since last visit but continues to have right knee swelling and discomfort with ROM. Denies pain today.

## 2018-08-21 NOTE — H&P PST ADULT - PROBLEM SELECTOR PLAN 2
Labs-CBC, CMP, PT/PTT, T&S, Nose cx, UA, C/S and EKG, right knee X-ray     Pre op and 3 day of Hibiclens instructions reviewed and given. Take routine am Levothyroxine DOS with sip of water. Avoid NSAIDs and OTC supplements. Verbalized understanding Labs-CBC, T&S and A1c pending. Previous blood work, EKG, Xray and Cultures from 6/27, ok'd as per anesthesia  MC from Dr. Walter from 6/29 on chart, ok'd by anesthesia  Pre op and 3 day of Hibiclens instructions reviewed and given. Take routine am Levothyroxine DOS with sip of water. Avoid NSAIDs and OTC supplements. Verbalized understanding

## 2018-08-21 NOTE — H&P PST ADULT - ASSESSMENT
57 yo female with OA of the left knee scheduled for a right total knee replacement  on 7/6 with Dr. Martinez. 59 yo female with OA of the right knee scheduled for a right total knee replacement  on 9/7 with Dr. Martinez.

## 2018-09-07 ENCOUNTER — INPATIENT (INPATIENT)
Facility: HOSPITAL | Age: 58
LOS: 2 days | Discharge: HOME | DRG: 470 | End: 2018-09-10
Attending: ORTHOPAEDIC SURGERY | Admitting: ORTHOPAEDIC SURGERY
Payer: MEDICAID

## 2018-09-07 VITALS
HEIGHT: 67 IN | HEART RATE: 63 BPM | TEMPERATURE: 98 F | OXYGEN SATURATION: 96 % | SYSTOLIC BLOOD PRESSURE: 98 MMHG | DIASTOLIC BLOOD PRESSURE: 61 MMHG | WEIGHT: 248.9 LBS

## 2018-09-07 DIAGNOSIS — Z96.652 PRESENCE OF LEFT ARTIFICIAL KNEE JOINT: Chronic | ICD-10-CM

## 2018-09-07 DIAGNOSIS — Z90.49 ACQUIRED ABSENCE OF OTHER SPECIFIED PARTS OF DIGESTIVE TRACT: Chronic | ICD-10-CM

## 2018-09-07 DIAGNOSIS — Z98.890 OTHER SPECIFIED POSTPROCEDURAL STATES: Chronic | ICD-10-CM

## 2018-09-07 DIAGNOSIS — M67.40 GANGLION, UNSPECIFIED SITE: Chronic | ICD-10-CM

## 2018-09-07 DIAGNOSIS — M17.11 UNILATERAL PRIMARY OSTEOARTHRITIS, RIGHT KNEE: ICD-10-CM

## 2018-09-07 LAB
ANION GAP SERPL CALC-SCNC: 7 MMOL/L — SIGNIFICANT CHANGE UP (ref 5–17)
BUN SERPL-MCNC: 13 MG/DL — SIGNIFICANT CHANGE UP (ref 7–23)
CALCIUM SERPL-MCNC: 8.6 MG/DL — SIGNIFICANT CHANGE UP (ref 8.5–10.1)
CHLORIDE SERPL-SCNC: 109 MMOL/L — HIGH (ref 96–108)
CO2 SERPL-SCNC: 25 MMOL/L — SIGNIFICANT CHANGE UP (ref 22–31)
CREAT SERPL-MCNC: 0.52 MG/DL — SIGNIFICANT CHANGE UP (ref 0.5–1.3)
GLUCOSE SERPL-MCNC: 130 MG/DL — HIGH (ref 70–99)
HCT VFR BLD CALC: 41.4 % — SIGNIFICANT CHANGE UP (ref 34.5–45)
HGB BLD-MCNC: 13.4 G/DL — SIGNIFICANT CHANGE UP (ref 11.5–15.5)
MCHC RBC-ENTMCNC: 29.1 PG — SIGNIFICANT CHANGE UP (ref 27–34)
MCHC RBC-ENTMCNC: 32.4 GM/DL — SIGNIFICANT CHANGE UP (ref 32–36)
MCV RBC AUTO: 90 FL — SIGNIFICANT CHANGE UP (ref 80–100)
NRBC # BLD: 0 /100 WBCS — SIGNIFICANT CHANGE UP (ref 0–0)
PLATELET # BLD AUTO: 175 K/UL — SIGNIFICANT CHANGE UP (ref 150–400)
POTASSIUM SERPL-MCNC: 4.5 MMOL/L — SIGNIFICANT CHANGE UP (ref 3.5–5.3)
POTASSIUM SERPL-SCNC: 4.5 MMOL/L — SIGNIFICANT CHANGE UP (ref 3.5–5.3)
RBC # BLD: 4.6 M/UL — SIGNIFICANT CHANGE UP (ref 3.8–5.2)
RBC # FLD: 14.5 % — SIGNIFICANT CHANGE UP (ref 10.3–14.5)
SODIUM SERPL-SCNC: 141 MMOL/L — SIGNIFICANT CHANGE UP (ref 135–145)
WBC # BLD: 10.85 K/UL — HIGH (ref 3.8–10.5)
WBC # FLD AUTO: 10.85 K/UL — HIGH (ref 3.8–10.5)

## 2018-09-07 PROCEDURE — 88311 DECALCIFY TISSUE: CPT | Mod: 26

## 2018-09-07 PROCEDURE — 73562 X-RAY EXAM OF KNEE 3: CPT | Mod: 26,RT

## 2018-09-07 PROCEDURE — 88305 TISSUE EXAM BY PATHOLOGIST: CPT | Mod: 26

## 2018-09-07 RX ORDER — ENOXAPARIN SODIUM 100 MG/ML
30 INJECTION SUBCUTANEOUS EVERY 12 HOURS
Qty: 0 | Refills: 0 | Status: DISCONTINUED | OUTPATIENT
Start: 2018-09-08 | End: 2018-09-10

## 2018-09-07 RX ORDER — OXYCODONE HYDROCHLORIDE 5 MG/1
10 TABLET ORAL EVERY 4 HOURS
Qty: 0 | Refills: 0 | Status: DISCONTINUED | OUTPATIENT
Start: 2018-09-07 | End: 2018-09-10

## 2018-09-07 RX ORDER — BENZOCAINE AND MENTHOL 5; 1 G/100ML; G/100ML
1 LIQUID ORAL EVERY 4 HOURS
Qty: 0 | Refills: 0 | Status: DISCONTINUED | OUTPATIENT
Start: 2018-09-07 | End: 2018-09-10

## 2018-09-07 RX ORDER — ACETAMINOPHEN 500 MG
650 TABLET ORAL EVERY 6 HOURS
Qty: 0 | Refills: 0 | Status: DISCONTINUED | OUTPATIENT
Start: 2018-09-07 | End: 2018-09-10

## 2018-09-07 RX ORDER — BUPIVACAINE HCL/PF 7.5 MG/ML
400 VIAL (ML) INJECTION
Qty: 0 | Refills: 0 | Status: DISCONTINUED | OUTPATIENT
Start: 2018-09-07 | End: 2018-09-07

## 2018-09-07 RX ORDER — LEVOTHYROXINE SODIUM 125 MCG
225 TABLET ORAL DAILY
Qty: 0 | Refills: 0 | Status: DISCONTINUED | OUTPATIENT
Start: 2018-09-07 | End: 2018-09-10

## 2018-09-07 RX ORDER — FERROUS SULFATE 325(65) MG
325 TABLET ORAL
Qty: 0 | Refills: 0 | Status: DISCONTINUED | OUTPATIENT
Start: 2018-09-07 | End: 2018-09-10

## 2018-09-07 RX ORDER — HYDROMORPHONE HYDROCHLORIDE 2 MG/ML
2 INJECTION INTRAMUSCULAR; INTRAVENOUS; SUBCUTANEOUS
Qty: 0 | Refills: 0 | Status: DISCONTINUED | OUTPATIENT
Start: 2018-09-07 | End: 2018-09-07

## 2018-09-07 RX ORDER — ONDANSETRON 8 MG/1
4 TABLET, FILM COATED ORAL EVERY 6 HOURS
Qty: 0 | Refills: 0 | Status: DISCONTINUED | OUTPATIENT
Start: 2018-09-07 | End: 2018-09-10

## 2018-09-07 RX ORDER — INFLUENZA VIRUS VACCINE 15; 15; 15; 15 UG/.5ML; UG/.5ML; UG/.5ML; UG/.5ML
0.5 SUSPENSION INTRAMUSCULAR ONCE
Qty: 0 | Refills: 0 | Status: DISCONTINUED | OUTPATIENT
Start: 2018-09-07 | End: 2018-09-10

## 2018-09-07 RX ORDER — ACETAMINOPHEN 500 MG
650 TABLET ORAL ONCE
Qty: 0 | Refills: 0 | Status: COMPLETED | OUTPATIENT
Start: 2018-09-07 | End: 2018-09-07

## 2018-09-07 RX ORDER — SODIUM CHLORIDE 9 MG/ML
1000 INJECTION, SOLUTION INTRAVENOUS
Qty: 0 | Refills: 0 | Status: DISCONTINUED | OUTPATIENT
Start: 2018-09-07 | End: 2018-09-07

## 2018-09-07 RX ORDER — ENOXAPARIN SODIUM 100 MG/ML
30 INJECTION SUBCUTANEOUS
Qty: 60 | Refills: 0 | OUTPATIENT
Start: 2018-09-07 | End: 2018-10-06

## 2018-09-07 RX ORDER — BUPIVACAINE HCL/PF 7.5 MG/ML
400 VIAL (ML) INJECTION
Qty: 0 | Refills: 0 | Status: DISCONTINUED | OUTPATIENT
Start: 2018-09-07 | End: 2018-09-10

## 2018-09-07 RX ORDER — HYDROMORPHONE HYDROCHLORIDE 2 MG/ML
0.5 INJECTION INTRAMUSCULAR; INTRAVENOUS; SUBCUTANEOUS
Qty: 0 | Refills: 0 | Status: DISCONTINUED | OUTPATIENT
Start: 2018-09-07 | End: 2018-09-07

## 2018-09-07 RX ORDER — ONDANSETRON 8 MG/1
4 TABLET, FILM COATED ORAL ONCE
Qty: 0 | Refills: 0 | Status: COMPLETED | OUTPATIENT
Start: 2018-09-07 | End: 2018-09-07

## 2018-09-07 RX ORDER — LANOLIN ALCOHOL/MO/W.PET/CERES
3 CREAM (GRAM) TOPICAL AT BEDTIME
Qty: 0 | Refills: 0 | Status: DISCONTINUED | OUTPATIENT
Start: 2018-09-07 | End: 2018-09-10

## 2018-09-07 RX ORDER — DIPHENHYDRAMINE HCL 50 MG
25 CAPSULE ORAL EVERY 4 HOURS
Qty: 0 | Refills: 0 | Status: DISCONTINUED | OUTPATIENT
Start: 2018-09-07 | End: 2018-09-10

## 2018-09-07 RX ORDER — FOLIC ACID 0.8 MG
1 TABLET ORAL DAILY
Qty: 0 | Refills: 0 | Status: DISCONTINUED | OUTPATIENT
Start: 2018-09-07 | End: 2018-09-10

## 2018-09-07 RX ORDER — ENOXAPARIN SODIUM 100 MG/ML
40 INJECTION SUBCUTANEOUS
Qty: 30 | Refills: 0 | OUTPATIENT
Start: 2018-09-07 | End: 2018-10-06

## 2018-09-07 RX ORDER — ACETAMINOPHEN 500 MG
1000 TABLET ORAL ONCE
Qty: 0 | Refills: 0 | Status: COMPLETED | OUTPATIENT
Start: 2018-09-07 | End: 2018-09-07

## 2018-09-07 RX ORDER — HYDROMORPHONE HYDROCHLORIDE 2 MG/ML
0.5 INJECTION INTRAMUSCULAR; INTRAVENOUS; SUBCUTANEOUS EVERY 4 HOURS
Qty: 0 | Refills: 0 | Status: DISCONTINUED | OUTPATIENT
Start: 2018-09-07 | End: 2018-09-08

## 2018-09-07 RX ORDER — MAGNESIUM HYDROXIDE 400 MG/1
30 TABLET, CHEWABLE ORAL DAILY
Qty: 0 | Refills: 0 | Status: DISCONTINUED | OUTPATIENT
Start: 2018-09-07 | End: 2018-09-10

## 2018-09-07 RX ORDER — ASCORBIC ACID 60 MG
500 TABLET,CHEWABLE ORAL
Qty: 0 | Refills: 0 | Status: DISCONTINUED | OUTPATIENT
Start: 2018-09-07 | End: 2018-09-10

## 2018-09-07 RX ORDER — DOCUSATE SODIUM 100 MG
100 CAPSULE ORAL THREE TIMES A DAY
Qty: 0 | Refills: 0 | Status: DISCONTINUED | OUTPATIENT
Start: 2018-09-07 | End: 2018-09-10

## 2018-09-07 RX ORDER — OXYCODONE HYDROCHLORIDE 5 MG/1
5 TABLET ORAL EVERY 4 HOURS
Qty: 0 | Refills: 0 | Status: DISCONTINUED | OUTPATIENT
Start: 2018-09-07 | End: 2018-09-10

## 2018-09-07 RX ORDER — SENNA PLUS 8.6 MG/1
2 TABLET ORAL AT BEDTIME
Qty: 0 | Refills: 0 | Status: DISCONTINUED | OUTPATIENT
Start: 2018-09-07 | End: 2018-09-10

## 2018-09-07 RX ORDER — POLYETHYLENE GLYCOL 3350 17 G/17G
17 POWDER, FOR SOLUTION ORAL DAILY
Qty: 0 | Refills: 0 | Status: DISCONTINUED | OUTPATIENT
Start: 2018-09-07 | End: 2018-09-10

## 2018-09-07 RX ORDER — BUPIVACAINE 13.3 MG/ML
20 INJECTION, SUSPENSION, LIPOSOMAL INFILTRATION ONCE
Qty: 0 | Refills: 0 | Status: DISCONTINUED | OUTPATIENT
Start: 2018-09-07 | End: 2018-09-07

## 2018-09-07 RX ORDER — VANCOMYCIN HCL 1 G
1750 VIAL (EA) INTRAVENOUS ONCE
Qty: 0 | Refills: 0 | Status: COMPLETED | OUTPATIENT
Start: 2018-09-07 | End: 2018-09-07

## 2018-09-07 RX ORDER — SODIUM CHLORIDE 9 MG/ML
1000 INJECTION, SOLUTION INTRAVENOUS
Qty: 0 | Refills: 0 | Status: DISCONTINUED | OUTPATIENT
Start: 2018-09-07 | End: 2018-09-10

## 2018-09-07 RX ADMIN — OXYCODONE HYDROCHLORIDE 10 MILLIGRAM(S): 5 TABLET ORAL at 22:30

## 2018-09-07 RX ADMIN — Medication 4 MILLILITER(S): at 15:37

## 2018-09-07 RX ADMIN — HYDROMORPHONE HYDROCHLORIDE 0.5 MILLIGRAM(S): 2 INJECTION INTRAMUSCULAR; INTRAVENOUS; SUBCUTANEOUS at 14:47

## 2018-09-07 RX ADMIN — Medication 325 MILLIGRAM(S): at 20:24

## 2018-09-07 RX ADMIN — HYDROMORPHONE HYDROCHLORIDE 0.5 MILLIGRAM(S): 2 INJECTION INTRAMUSCULAR; INTRAVENOUS; SUBCUTANEOUS at 14:24

## 2018-09-07 RX ADMIN — Medication 100 MILLIGRAM(S): at 21:25

## 2018-09-07 RX ADMIN — Medication 500 MILLIGRAM(S): at 20:24

## 2018-09-07 RX ADMIN — OXYCODONE HYDROCHLORIDE 10 MILLIGRAM(S): 5 TABLET ORAL at 21:36

## 2018-09-07 RX ADMIN — Medication 1 TABLET(S): at 21:26

## 2018-09-07 RX ADMIN — HYDROMORPHONE HYDROCHLORIDE 0.5 MILLIGRAM(S): 2 INJECTION INTRAMUSCULAR; INTRAVENOUS; SUBCUTANEOUS at 14:10

## 2018-09-07 RX ADMIN — Medication 650 MILLIGRAM(S): at 20:23

## 2018-09-07 RX ADMIN — HYDROMORPHONE HYDROCHLORIDE 0.5 MILLIGRAM(S): 2 INJECTION INTRAMUSCULAR; INTRAVENOUS; SUBCUTANEOUS at 14:57

## 2018-09-07 RX ADMIN — Medication 650 MILLIGRAM(S): at 21:30

## 2018-09-07 RX ADMIN — OXYCODONE HYDROCHLORIDE 10 MILLIGRAM(S): 5 TABLET ORAL at 17:34

## 2018-09-07 RX ADMIN — SODIUM CHLORIDE 100 MILLILITER(S): 9 INJECTION, SOLUTION INTRAVENOUS at 15:41

## 2018-09-07 RX ADMIN — SODIUM CHLORIDE 75 MILLILITER(S): 9 INJECTION, SOLUTION INTRAVENOUS at 08:47

## 2018-09-07 RX ADMIN — ONDANSETRON 4 MILLIGRAM(S): 8 TABLET, FILM COATED ORAL at 14:13

## 2018-09-07 NOTE — PROGRESS NOTE ADULT - ASSESSMENT
A/P: 58F s/p R TKA POD 0  Analgesia; OnQ Pump  Resume DVT ppx in AM  WBAT RLE  Encourage incentive spirometry  FU Labs  PT/OT  Will discuss with attending and advise if plan changes

## 2018-09-07 NOTE — DISCHARGE NOTE ADULT - MEDICATION SUMMARY - MEDICATIONS TO TAKE
I will START or STAY ON the medications listed below when I get home from the hospital:    oxyCODONE-acetaminophen 5 mg-325 mg oral tablet  -- 1 tab(s) by mouth every 4 hours x 7 days, As Needed -for severe pain MDD:6   -- Caution federal law prohibits the transfer of this drug to any person other  than the person for whom it was prescribed.  May cause drowsiness.  Alcohol may intensify this effect.  Use care when operating dangerous machinery.  This prescription cannot be refilled.  This product contains acetaminophen.  Do not use  with any other product containing acetaminophen to prevent possible liver damage.  Using more of this medication than prescribed may cause serious breathing problems.    -- Indication: For For moderate pain as needed    Lovenox 30 mg/0.3 mL injectable solution  -- 30 milligram(s) subcutaneously 2 times a day; 60 total injections  -- It is very important that you take or use this exactly as directed.  Do not skip doses or discontinue unless directed by your doctor.    -- Indication: For home med, and dvt prophylaxis    levothyroxine  -- 225 mcg daily  -- Indication: For home med

## 2018-09-07 NOTE — DISCHARGE NOTE ADULT - CARE PLAN
Principal Discharge DX:	Total knee replacement status, right  Goal:	Return to baseline ADL's  Assessment and plan of treatment:	Discharge Instructions for Total Knee Arthroplasty    1.  Diet: Resume previous diet  2. Activity: WBAT, Rolling walker, Daily PT. Gentle ROM 0-full as tolerated. Walk plenty.  Keep a bump (rolled towel or blanket) under your heel to keep leg straight while in bed or chair for 2 weeks.  3. Call with: fever over 101, wound redness, drainage or open area, calf pain/calf swelling  4. Wound Care: Remove old and place new Aquacel  bandage to Knee every 7 days. RN to Remove Staples Post Op Day #14 (9/21) so long as wound is healed, no drainage or open area. OK to Shower with Aquacel; Must be and Aquacel bandage to shower.  Avoid direct water beating on bandage.  Continue ICE packs to knee. No bandage needed after staple removal.  5. DVT PE Prophylaxis: Lovenox 40mg. See Med Rec.  6. Follow Up: Dr. Sherwood in 21 days (1 week after staples removed);  Call to schedule.   7. Pain medications:  If going home, eRX sent to your pharmacy for .

## 2018-09-07 NOTE — PATIENT PROFILE ADULT. - CONTRAINDICATIONS & PRECAUTIONS (SELECT ALL THAT APPLY)
none... Moderate to severe acute illness with or without fever. Delay administration of vaccination until patient has been afebrile or illness resolved for 24hrs

## 2018-09-07 NOTE — PROGRESS NOTE ADULT - SUBJECTIVE AND OBJECTIVE BOX
Orthopedic Post-Operative Check    Patient seen and examined at bedside. Reports no acute complaints at this time. Pain is well controlled. Patient tolerated procedure well without any complications    PHYSICAL EXAM:  Vital Signs Last 24 Hrs  T(C): 36.7 (07 Sep 2018 14:44), Max: 37.3 (07 Sep 2018 13:59)  T(F): 98.1 (07 Sep 2018 14:44), Max: 99.1 (07 Sep 2018 13:59)  HR: 68 (07 Sep 2018 15:04) (54 - 74)  BP: 92/44 (07 Sep 2018 15:04) (92/44 - 140/57)  RR: 12 (07 Sep 2018 15:04) (11 - 14)  SpO2: 98% (07 Sep 2018 15:04) (96% - 98%)    Gen: NAD; Resting comfortably  RLE: Ace Bandage in place; Clean, dry and intact  OnQ Pump in Place  SILT L3-S1  +EHL/FHL/TA/GSC[  +DP  Compartments soft and compressible  No calf tenderness Orthopedic Post-Operative Check    Patient seen and examined at bedside. Reports no acute complaints at this time. Pain is well controlled. Patient tolerated procedure well without any complications    PHYSICAL EXAM:  Vital Signs Last 24 Hrs  T(C): 36.7 (07 Sep 2018 14:44), Max: 37.3 (07 Sep 2018 13:59)  T(F): 98.1 (07 Sep 2018 14:44), Max: 99.1 (07 Sep 2018 13:59)  HR: 68 (07 Sep 2018 15:04) (54 - 74)  BP: 92/44 (07 Sep 2018 15:04) (92/44 - 140/57)  RR: 12 (07 Sep 2018 15:04) (11 - 14)  SpO2: 98% (07 Sep 2018 15:04) (96% - 98%)    Gen: NAD; Resting comfortably  RLE: Ace Bandage in place; Clean, dry and intact  OnQ Pump in Place  SILT L3-S1  +EHL/FHL/TA/GSC  +DP  Compartments soft and compressible  No calf tenderness

## 2018-09-07 NOTE — DISCHARGE NOTE ADULT - PATIENT PORTAL LINK FT
You can access the [facility], offered by [facility], by registering with the following website: [email or web address]

## 2018-09-07 NOTE — DISCHARGE NOTE ADULT - PLAN OF CARE
Return to baseline ADL's Discharge Instructions for Total Knee Arthroplasty    1.  Diet: Resume previous diet  2. Activity: WBAT, Rolling walker, Daily PT. Gentle ROM 0-full as tolerated. Walk plenty.  Keep a bump (rolled towel or blanket) under your heel to keep leg straight while in bed or chair for 2 weeks.  3. Call with: fever over 101, wound redness, drainage or open area, calf pain/calf swelling  4. Wound Care: Remove old and place new Aquacel  bandage to Knee every 7 days. RN to Remove Staples Post Op Day #14 (9/21) so long as wound is healed, no drainage or open area. OK to Shower with Aquacel; Must be and Aquacel bandage to shower.  Avoid direct water beating on bandage.  Continue ICE packs to knee. No bandage needed after staple removal.  5. DVT PE Prophylaxis: Lovenox 40mg. See Med Rec.  6. Follow Up: Dr. Sherwood in 21 days (1 week after staples removed);  Call to schedule.   7. Pain medications:  If going home, eRX sent to your pharmacy for .

## 2018-09-07 NOTE — ASU PREOP CHECKLIST - PATIENT'S PERSONAL PROPERTY GIVEN TO
on unit [Healthy Appearing] : healthy appearing [Well Nourished] : well nourished [Interactive] : interactive [Obese] : obese [Acanthosis Nigricans___] : acanthosis nigricans over [unfilled] [Normal Appearance] : normal appearance [Well formed] : well formed [Normally Set] : normally set [Normal S1 and S2] : normal S1 and S2 [Clear to Ausculation Bilaterally] : clear to auscultation bilaterally [Abdomen Soft] : soft [Abdomen Tenderness] : non-tender [] : no hepatosplenomegaly [4] : was Rosendo stage 4 [Rosendo Stage ___] : the Rosendo stage for breast development was [unfilled] [Normal] : normal  [Pale Striae on Flanks] : no pale striae on flanks [Goiter] : no goiter [Murmur] : no murmurs

## 2018-09-07 NOTE — DISCHARGE NOTE ADULT - HOSPITAL COURSE
H&P:  Pt is a58y Female PAST MEDICAL & SURGICAL HISTORY:  Obesity (BMI 30-39.9)  Chronic pain of right knee  Primary osteoarthritis of right knee  Hypothyroidism  Personal history of arthritis  Status post left knee replacement: 5/4/2018  S/P cholecystectomy  Ganglion cyst: left wrist 1979  History of arthroscopy of both knees: many yrs ago       Now s/p Total Knee Arthroplasty. Pt is afebrile with stable vital signs. Pain is controlled. Alert and Oriented. Exam reveals intact EHL FHL TA GS, +DP. Dressing is clean and dry with a New Aquacel bandage on.    Vitals****************  Labs***************        Hospital Course:  Patient presented to [facility] medically cleared for elective Knee Replacement Surgery, having failed outpatient conservative management. Prophylactic antibiotics were started before the procedure and continued for 24 hours. They were admitted after surgery to the orthopedic floor.   There were no complications during the hospital stay. All home medications were continued. **Pt received **U PRBC post op for Acute Blood loss Anemia.    Routine consults were obtained from Physical Therapy for twice daily PT, and from the Hospitalist for Medical Co-management***. Patient was placed on Lovenox 30mg subq bid for anticoagulation.  Pertinent home medications were continued.  Daily labs were followed.      On POD 0 pt was stable overnight. Pt received twice daily PT and a new Aquacel dressing was applied prior to discharge. The plan is for DC to home with home PT** or to Rehab for ongoing PT**.  The orthopedic Attending is aware and agrees. The patient is a 58 year old F status post elective Total Knee Arthroplasty to the R knee after failing outpatient non-operative conservative management.  Patient presented to [facility] after being medically cleared for an elective surgical procedure. The patient was taken to the operating room on date mentioned above. Prophylactic antibiotics were started before the procedure and continued for 24 hours.  There were no complications during the procedure and patient tolerated the procedure well.  The patient was transferred to recovery room in stable condition and subsequently to surgical floor.  Patient was placed on Lovenox for anticoagulation.  All home medications were continued.  The patient received physical therapy daily and daily labs were followed.  The dressing was kept clean, dry, intact.  The rest of the hospital stay was unremarkable.

## 2018-09-07 NOTE — BRIEF OPERATIVE NOTE - PROCEDURE
<<-----Click on this checkbox to enter Procedure Total knee replacement  09/07/2018    Active  WILLIE

## 2018-09-08 LAB
ANION GAP SERPL CALC-SCNC: 8 MMOL/L — SIGNIFICANT CHANGE UP (ref 5–17)
BUN SERPL-MCNC: 12 MG/DL — SIGNIFICANT CHANGE UP (ref 7–23)
CALCIUM SERPL-MCNC: 8.4 MG/DL — LOW (ref 8.5–10.1)
CHLORIDE SERPL-SCNC: 106 MMOL/L — SIGNIFICANT CHANGE UP (ref 96–108)
CO2 SERPL-SCNC: 29 MMOL/L — SIGNIFICANT CHANGE UP (ref 22–31)
CREAT SERPL-MCNC: 0.55 MG/DL — SIGNIFICANT CHANGE UP (ref 0.5–1.3)
GLUCOSE SERPL-MCNC: 135 MG/DL — HIGH (ref 70–99)
HCT VFR BLD CALC: 35.1 % — SIGNIFICANT CHANGE UP (ref 34.5–45)
HGB BLD-MCNC: 11.3 G/DL — LOW (ref 11.5–15.5)
MCHC RBC-ENTMCNC: 29 PG — SIGNIFICANT CHANGE UP (ref 27–34)
MCHC RBC-ENTMCNC: 32.2 GM/DL — SIGNIFICANT CHANGE UP (ref 32–36)
MCV RBC AUTO: 90.2 FL — SIGNIFICANT CHANGE UP (ref 80–100)
NRBC # BLD: 0 /100 WBCS — SIGNIFICANT CHANGE UP (ref 0–0)
PLATELET # BLD AUTO: 182 K/UL — SIGNIFICANT CHANGE UP (ref 150–400)
POTASSIUM SERPL-MCNC: 3.9 MMOL/L — SIGNIFICANT CHANGE UP (ref 3.5–5.3)
POTASSIUM SERPL-SCNC: 3.9 MMOL/L — SIGNIFICANT CHANGE UP (ref 3.5–5.3)
RBC # BLD: 3.89 M/UL — SIGNIFICANT CHANGE UP (ref 3.8–5.2)
RBC # FLD: 14.6 % — HIGH (ref 10.3–14.5)
SODIUM SERPL-SCNC: 143 MMOL/L — SIGNIFICANT CHANGE UP (ref 135–145)
WBC # BLD: 10.74 K/UL — HIGH (ref 3.8–10.5)
WBC # FLD AUTO: 10.74 K/UL — HIGH (ref 3.8–10.5)

## 2018-09-08 RX ORDER — HYDROMORPHONE HYDROCHLORIDE 2 MG/ML
1 INJECTION INTRAMUSCULAR; INTRAVENOUS; SUBCUTANEOUS EVERY 4 HOURS
Qty: 0 | Refills: 0 | Status: DISCONTINUED | OUTPATIENT
Start: 2018-09-08 | End: 2018-09-10

## 2018-09-08 RX ORDER — ACETAMINOPHEN 500 MG
1000 TABLET ORAL ONCE
Qty: 0 | Refills: 0 | Status: COMPLETED | OUTPATIENT
Start: 2018-09-08 | End: 2018-09-08

## 2018-09-08 RX ADMIN — Medication 500 MILLIGRAM(S): at 18:40

## 2018-09-08 RX ADMIN — ENOXAPARIN SODIUM 30 MILLIGRAM(S): 100 INJECTION SUBCUTANEOUS at 06:03

## 2018-09-08 RX ADMIN — HYDROMORPHONE HYDROCHLORIDE 1 MILLIGRAM(S): 2 INJECTION INTRAMUSCULAR; INTRAVENOUS; SUBCUTANEOUS at 15:13

## 2018-09-08 RX ADMIN — Medication 3 MILLIGRAM(S): at 01:51

## 2018-09-08 RX ADMIN — Medication 1 MILLIGRAM(S): at 11:35

## 2018-09-08 RX ADMIN — HYDROMORPHONE HYDROCHLORIDE 1 MILLIGRAM(S): 2 INJECTION INTRAMUSCULAR; INTRAVENOUS; SUBCUTANEOUS at 21:42

## 2018-09-08 RX ADMIN — Medication 1 TABLET(S): at 11:35

## 2018-09-08 RX ADMIN — Medication 1 TABLET(S): at 06:02

## 2018-09-08 RX ADMIN — Medication 1 TABLET(S): at 14:39

## 2018-09-08 RX ADMIN — Medication 225 MICROGRAM(S): at 06:04

## 2018-09-08 RX ADMIN — Medication 500 MILLIGRAM(S): at 06:02

## 2018-09-08 RX ADMIN — Medication 325 MILLIGRAM(S): at 18:40

## 2018-09-08 RX ADMIN — HYDROMORPHONE HYDROCHLORIDE 0.5 MILLIGRAM(S): 2 INJECTION INTRAMUSCULAR; INTRAVENOUS; SUBCUTANEOUS at 01:51

## 2018-09-08 RX ADMIN — Medication 400 MILLIGRAM(S): at 12:07

## 2018-09-08 RX ADMIN — Medication 1000 MILLIGRAM(S): at 12:37

## 2018-09-08 RX ADMIN — Medication 100 MILLIGRAM(S): at 21:42

## 2018-09-08 RX ADMIN — Medication 325 MILLIGRAM(S): at 11:35

## 2018-09-08 RX ADMIN — ENOXAPARIN SODIUM 30 MILLIGRAM(S): 100 INJECTION SUBCUTANEOUS at 18:40

## 2018-09-08 RX ADMIN — Medication 1 TABLET(S): at 21:42

## 2018-09-08 RX ADMIN — Medication 100 MILLIGRAM(S): at 14:39

## 2018-09-08 RX ADMIN — HYDROMORPHONE HYDROCHLORIDE 0.5 MILLIGRAM(S): 2 INJECTION INTRAMUSCULAR; INTRAVENOUS; SUBCUTANEOUS at 06:02

## 2018-09-08 RX ADMIN — HYDROMORPHONE HYDROCHLORIDE 1 MILLIGRAM(S): 2 INJECTION INTRAMUSCULAR; INTRAVENOUS; SUBCUTANEOUS at 14:42

## 2018-09-08 RX ADMIN — Medication 100 MILLIGRAM(S): at 06:02

## 2018-09-08 RX ADMIN — Medication 325 MILLIGRAM(S): at 08:47

## 2018-09-08 RX ADMIN — HYDROMORPHONE HYDROCHLORIDE 0.5 MILLIGRAM(S): 2 INJECTION INTRAMUSCULAR; INTRAVENOUS; SUBCUTANEOUS at 07:40

## 2018-09-08 RX ADMIN — OXYCODONE HYDROCHLORIDE 10 MILLIGRAM(S): 5 TABLET ORAL at 11:00

## 2018-09-08 RX ADMIN — HYDROMORPHONE HYDROCHLORIDE 1 MILLIGRAM(S): 2 INJECTION INTRAMUSCULAR; INTRAVENOUS; SUBCUTANEOUS at 22:30

## 2018-09-08 RX ADMIN — OXYCODONE HYDROCHLORIDE 10 MILLIGRAM(S): 5 TABLET ORAL at 10:00

## 2018-09-08 RX ADMIN — HYDROMORPHONE HYDROCHLORIDE 0.5 MILLIGRAM(S): 2 INJECTION INTRAMUSCULAR; INTRAVENOUS; SUBCUTANEOUS at 02:30

## 2018-09-08 NOTE — PROVIDER CONTACT NOTE (OTHER) - BACKGROUND
line disconnection happened this morning and reconnected   pt complained that she doesn't feel the solution is administering to her leg

## 2018-09-08 NOTE — PROGRESS NOTE ADULT - SUBJECTIVE AND OBJECTIVE BOX
58y Female s/p   under   on     T(C): 36.8 (09-08-18 @ 11:35), Max: 37.4 (09-07-18 @ 20:15)  HR: 70 (09-08-18 @ 11:35) (54 - 82)  BP: 125/70 (09-08-18 @ 11:35) (92/44 - 140/57)  RR: 18 (09-08-18 @ 11:35) (11 - 18)  SpO2: 99% (09-08-18 @ 11:35) (91% - 99%)  Wt(kg): --    Pt seen, doing well, no anesthesia complications or complaints noted or reported.   No Nausea  Pain well controlled s/p adductor canal block

## 2018-09-08 NOTE — OCCUPATIONAL THERAPY INITIAL EVALUATION ADULT - ADDITIONAL COMMENTS
Pt has shower bench and commode from previous knee surgery . She also has a reacher which she uses for dressing tasks.

## 2018-09-08 NOTE — PROGRESS NOTE ADULT - ATTENDING COMMENTS
NVI, NO calf tenderness, PT, WBAT, I asked dr small to ask anesthesia about pain pump as pt states it is not working well.

## 2018-09-08 NOTE — OCCUPATIONAL THERAPY INITIAL EVALUATION ADULT - TRANSFER TRAINING, PT EVAL
GOAL: Pt will perform sit to stand, bed <-> chair, toilet transfers, tub transfer independently in 2  weeks

## 2018-09-08 NOTE — PROGRESS NOTE ADULT - SUBJECTIVE AND OBJECTIVE BOX
Patient seen and examined at bedside. Pain controlled. No acute events overnight     Vital Signs Last 24 Hrs  T(C): 37.2 (08 Sep 2018 07:45), Max: 37.4 (07 Sep 2018 20:15)  T(F): 99 (08 Sep 2018 07:45), Max: 99.4 (07 Sep 2018 20:15)  HR: 82 (08 Sep 2018 07:45) (54 - 82)  BP: 130/73 (08 Sep 2018 07:45) (92/44 - 140/57)  BP(mean): --  RR: 17 (08 Sep 2018 07:45) (11 - 17)  SpO2: 96% (08 Sep 2018 07:45) (91% - 98%)    Gen: NAD; Resting comfortably  RLE: Ace Bandage in place; Clean, dry and intact  OnQ Pump in Place  SILT L3-S1  +EHL/FHL/TA/GSC  +DP  Compartments soft and compressible  No calf tenderness      A/P: 58F s/p R TKA POD 1  Analgesia; OnQ Pump  DVT ppx  WBAT RLE  Encourage incentive spirometry  FU Labs  PT/OT  DC planning  Will discuss with attending and advise if plan changes

## 2018-09-08 NOTE — OCCUPATIONAL THERAPY INITIAL EVALUATION ADULT - RANGE OF MOTION EXAMINATION, LOWER EXTREMITY
bilateral LE Active ROM was WFL  (within functional limits)/R knee to 65* flexion/bilateral LE Passive ROM was WFL  (within functional limits)

## 2018-09-09 LAB
ANION GAP SERPL CALC-SCNC: 7 MMOL/L — SIGNIFICANT CHANGE UP (ref 5–17)
BUN SERPL-MCNC: 9 MG/DL — SIGNIFICANT CHANGE UP (ref 7–23)
CALCIUM SERPL-MCNC: 9.2 MG/DL — SIGNIFICANT CHANGE UP (ref 8.5–10.1)
CHLORIDE SERPL-SCNC: 102 MMOL/L — SIGNIFICANT CHANGE UP (ref 96–108)
CO2 SERPL-SCNC: 30 MMOL/L — SIGNIFICANT CHANGE UP (ref 22–31)
CREAT SERPL-MCNC: 0.49 MG/DL — LOW (ref 0.5–1.3)
GLUCOSE SERPL-MCNC: 141 MG/DL — HIGH (ref 70–99)
HCT VFR BLD CALC: 32 % — LOW (ref 34.5–45)
HGB BLD-MCNC: 10.4 G/DL — LOW (ref 11.5–15.5)
MCHC RBC-ENTMCNC: 29.2 PG — SIGNIFICANT CHANGE UP (ref 27–34)
MCHC RBC-ENTMCNC: 32.5 GM/DL — SIGNIFICANT CHANGE UP (ref 32–36)
MCV RBC AUTO: 89.9 FL — SIGNIFICANT CHANGE UP (ref 80–100)
NRBC # BLD: 0 /100 WBCS — SIGNIFICANT CHANGE UP (ref 0–0)
PLATELET # BLD AUTO: 160 K/UL — SIGNIFICANT CHANGE UP (ref 150–400)
POTASSIUM SERPL-MCNC: 3.9 MMOL/L — SIGNIFICANT CHANGE UP (ref 3.5–5.3)
POTASSIUM SERPL-SCNC: 3.9 MMOL/L — SIGNIFICANT CHANGE UP (ref 3.5–5.3)
RBC # BLD: 3.56 M/UL — LOW (ref 3.8–5.2)
RBC # FLD: 14.5 % — SIGNIFICANT CHANGE UP (ref 10.3–14.5)
SODIUM SERPL-SCNC: 139 MMOL/L — SIGNIFICANT CHANGE UP (ref 135–145)
WBC # BLD: 11.9 K/UL — HIGH (ref 3.8–10.5)
WBC # FLD AUTO: 11.9 K/UL — HIGH (ref 3.8–10.5)

## 2018-09-09 RX ADMIN — Medication 325 MILLIGRAM(S): at 17:10

## 2018-09-09 RX ADMIN — Medication 100 MILLIGRAM(S): at 14:41

## 2018-09-09 RX ADMIN — Medication 325 MILLIGRAM(S): at 12:11

## 2018-09-09 RX ADMIN — HYDROMORPHONE HYDROCHLORIDE 1 MILLIGRAM(S): 2 INJECTION INTRAMUSCULAR; INTRAVENOUS; SUBCUTANEOUS at 22:40

## 2018-09-09 RX ADMIN — Medication 1 TABLET(S): at 12:11

## 2018-09-09 RX ADMIN — HYDROMORPHONE HYDROCHLORIDE 1 MILLIGRAM(S): 2 INJECTION INTRAMUSCULAR; INTRAVENOUS; SUBCUTANEOUS at 17:10

## 2018-09-09 RX ADMIN — Medication 1 TABLET(S): at 05:31

## 2018-09-09 RX ADMIN — Medication 100 MILLIGRAM(S): at 22:39

## 2018-09-09 RX ADMIN — Medication 225 MICROGRAM(S): at 05:31

## 2018-09-09 RX ADMIN — Medication 325 MILLIGRAM(S): at 09:39

## 2018-09-09 RX ADMIN — HYDROMORPHONE HYDROCHLORIDE 1 MILLIGRAM(S): 2 INJECTION INTRAMUSCULAR; INTRAVENOUS; SUBCUTANEOUS at 05:40

## 2018-09-09 RX ADMIN — ENOXAPARIN SODIUM 30 MILLIGRAM(S): 100 INJECTION SUBCUTANEOUS at 05:31

## 2018-09-09 RX ADMIN — Medication 1 TABLET(S): at 14:41

## 2018-09-09 RX ADMIN — Medication 1 TABLET(S): at 22:40

## 2018-09-09 RX ADMIN — POLYETHYLENE GLYCOL 3350 17 GRAM(S): 17 POWDER, FOR SOLUTION ORAL at 12:11

## 2018-09-09 RX ADMIN — ENOXAPARIN SODIUM 30 MILLIGRAM(S): 100 INJECTION SUBCUTANEOUS at 18:26

## 2018-09-09 RX ADMIN — HYDROMORPHONE HYDROCHLORIDE 1 MILLIGRAM(S): 2 INJECTION INTRAMUSCULAR; INTRAVENOUS; SUBCUTANEOUS at 23:00

## 2018-09-09 RX ADMIN — HYDROMORPHONE HYDROCHLORIDE 1 MILLIGRAM(S): 2 INJECTION INTRAMUSCULAR; INTRAVENOUS; SUBCUTANEOUS at 06:30

## 2018-09-09 RX ADMIN — Medication 500 MILLIGRAM(S): at 18:25

## 2018-09-09 RX ADMIN — Medication 100 MILLIGRAM(S): at 05:31

## 2018-09-09 RX ADMIN — Medication 1 MILLIGRAM(S): at 12:11

## 2018-09-09 RX ADMIN — HYDROMORPHONE HYDROCHLORIDE 1 MILLIGRAM(S): 2 INJECTION INTRAMUSCULAR; INTRAVENOUS; SUBCUTANEOUS at 18:24

## 2018-09-09 RX ADMIN — Medication 500 MILLIGRAM(S): at 05:31

## 2018-09-09 RX ADMIN — HYDROMORPHONE HYDROCHLORIDE 1 MILLIGRAM(S): 2 INJECTION INTRAMUSCULAR; INTRAVENOUS; SUBCUTANEOUS at 09:44

## 2018-09-09 RX ADMIN — HYDROMORPHONE HYDROCHLORIDE 1 MILLIGRAM(S): 2 INJECTION INTRAMUSCULAR; INTRAVENOUS; SUBCUTANEOUS at 10:43

## 2018-09-09 NOTE — PROGRESS NOTE ADULT - SUBJECTIVE AND OBJECTIVE BOX
Post Op Day 2 of Anesthesia Pain Management Service    SUBJECTIVE: doing well  		  OBJECTIVE:   Pain Score:  6 /10  Therapy:	[ ] IV PCA	[ ] Epidural   [ ] s/p Spinal Opioid	[ x] Peripheral nerve block  	  Vital Signs Last 24 Hrs  T(C): 36.9 (09 Sep 2018 07:48), Max: 37.6 (08 Sep 2018 20:11)  T(F): 98.5 (09 Sep 2018 07:48), Max: 99.6 (08 Sep 2018 20:11)  HR: 85 (09 Sep 2018 07:48) (65 - 86)  BP: 129/72 (09 Sep 2018 07:48) (109/69 - 129/72)  BP(mean): --  RR: 17 (09 Sep 2018 07:48) (16 - 18)  SpO2: 97% (09 Sep 2018 07:48) (94% - 99%)    (x ) Alert & Oriented     (x ) No motor/sensory block     ( -) Nausea     (- ) Pruritis     (- ) Headache    Catheter removed by surgeons

## 2018-09-09 NOTE — PROGRESS NOTE ADULT - SUBJECTIVE AND OBJECTIVE BOX
Patient seen and examined at bedside. Pain controlled. No acute events overnight.  Pt states OnQ pump is a hassle and she doesn't think it is working.  Pt showed OnQ pump nearly empty and explained it is still working.  Pt understood and still expressed removal of pump, so pump removed.      Vital Signs Last 24 Hrs  T(C): 36.9 (09 Sep 2018 07:48), Max: 37.6 (08 Sep 2018 20:11)  T(F): 98.5 (09 Sep 2018 07:48), Max: 99.6 (08 Sep 2018 20:11)  HR: 85 (09 Sep 2018 07:48) (65 - 86)  BP: 129/72 (09 Sep 2018 07:48) (109/69 - 129/72)  BP(mean): --  RR: 17 (09 Sep 2018 07:48) (16 - 18)  SpO2: 97% (09 Sep 2018 07:48) (94% - 99%)    Gen: NAD; Resting comfortably  RLE: Ace Bandage in place; Clean, dry and intact  OnQ Pump removed 2/2 pt request  SILT L3-S1  +EHL/FHL/TA/GSC  +DP  Compartments soft and compressible  No calf tenderness      A/P: 58F s/p R TKA POD 2    Analgesia  DVT ppx  WBAT RLE  Encourage incentive spirometry  FU Labs  PT/OT  DC planning--home tomorrow  Will discuss with attending and advise if plan changes

## 2018-09-10 VITALS
SYSTOLIC BLOOD PRESSURE: 102 MMHG | DIASTOLIC BLOOD PRESSURE: 54 MMHG | HEART RATE: 74 BPM | OXYGEN SATURATION: 97 % | TEMPERATURE: 99 F | RESPIRATION RATE: 16 BRPM

## 2018-09-10 LAB
ANION GAP SERPL CALC-SCNC: 7 MMOL/L — SIGNIFICANT CHANGE UP (ref 5–17)
BUN SERPL-MCNC: 11 MG/DL — SIGNIFICANT CHANGE UP (ref 7–23)
CALCIUM SERPL-MCNC: 9.1 MG/DL — SIGNIFICANT CHANGE UP (ref 8.5–10.1)
CHLORIDE SERPL-SCNC: 101 MMOL/L — SIGNIFICANT CHANGE UP (ref 96–108)
CO2 SERPL-SCNC: 33 MMOL/L — HIGH (ref 22–31)
CREAT SERPL-MCNC: 0.43 MG/DL — LOW (ref 0.5–1.3)
GLUCOSE SERPL-MCNC: 105 MG/DL — HIGH (ref 70–99)
HCT VFR BLD CALC: 30 % — LOW (ref 34.5–45)
HGB BLD-MCNC: 9.4 G/DL — LOW (ref 11.5–15.5)
MCHC RBC-ENTMCNC: 28.6 PG — SIGNIFICANT CHANGE UP (ref 27–34)
MCHC RBC-ENTMCNC: 31.3 GM/DL — LOW (ref 32–36)
MCV RBC AUTO: 91.2 FL — SIGNIFICANT CHANGE UP (ref 80–100)
NRBC # BLD: 0 /100 WBCS — SIGNIFICANT CHANGE UP (ref 0–0)
PLATELET # BLD AUTO: 170 K/UL — SIGNIFICANT CHANGE UP (ref 150–400)
POTASSIUM SERPL-MCNC: 3.6 MMOL/L — SIGNIFICANT CHANGE UP (ref 3.5–5.3)
POTASSIUM SERPL-SCNC: 3.6 MMOL/L — SIGNIFICANT CHANGE UP (ref 3.5–5.3)
RBC # BLD: 3.29 M/UL — LOW (ref 3.8–5.2)
RBC # FLD: 14.4 % — SIGNIFICANT CHANGE UP (ref 10.3–14.5)
SODIUM SERPL-SCNC: 141 MMOL/L — SIGNIFICANT CHANGE UP (ref 135–145)
WBC # BLD: 11.57 K/UL — HIGH (ref 3.8–10.5)
WBC # FLD AUTO: 11.57 K/UL — HIGH (ref 3.8–10.5)

## 2018-09-10 PROCEDURE — 85027 COMPLETE CBC AUTOMATED: CPT

## 2018-09-10 PROCEDURE — 93970 EXTREMITY STUDY: CPT | Mod: 26

## 2018-09-10 PROCEDURE — 80048 BASIC METABOLIC PNL TOTAL CA: CPT

## 2018-09-10 PROCEDURE — 97530 THERAPEUTIC ACTIVITIES: CPT

## 2018-09-10 PROCEDURE — C1713: CPT

## 2018-09-10 PROCEDURE — 73562 X-RAY EXAM OF KNEE 3: CPT

## 2018-09-10 PROCEDURE — 97162 PT EVAL MOD COMPLEX 30 MIN: CPT

## 2018-09-10 PROCEDURE — 82962 GLUCOSE BLOOD TEST: CPT

## 2018-09-10 PROCEDURE — 97112 NEUROMUSCULAR REEDUCATION: CPT

## 2018-09-10 PROCEDURE — C1776: CPT

## 2018-09-10 PROCEDURE — 97165 OT EVAL LOW COMPLEX 30 MIN: CPT

## 2018-09-10 PROCEDURE — 97116 GAIT TRAINING THERAPY: CPT

## 2018-09-10 PROCEDURE — 97535 SELF CARE MNGMENT TRAINING: CPT

## 2018-09-10 PROCEDURE — 93970 EXTREMITY STUDY: CPT

## 2018-09-10 PROCEDURE — 88311 DECALCIFY TISSUE: CPT

## 2018-09-10 PROCEDURE — 88305 TISSUE EXAM BY PATHOLOGIST: CPT

## 2018-09-10 RX ADMIN — ENOXAPARIN SODIUM 30 MILLIGRAM(S): 100 INJECTION SUBCUTANEOUS at 06:21

## 2018-09-10 RX ADMIN — Medication 100 MILLIGRAM(S): at 06:18

## 2018-09-10 RX ADMIN — HYDROMORPHONE HYDROCHLORIDE 1 MILLIGRAM(S): 2 INJECTION INTRAMUSCULAR; INTRAVENOUS; SUBCUTANEOUS at 08:06

## 2018-09-10 RX ADMIN — Medication 1 TABLET(S): at 13:08

## 2018-09-10 RX ADMIN — Medication 1 MILLIGRAM(S): at 13:08

## 2018-09-10 RX ADMIN — Medication 500 MILLIGRAM(S): at 06:18

## 2018-09-10 RX ADMIN — Medication 225 MICROGRAM(S): at 06:18

## 2018-09-10 RX ADMIN — Medication 1 TABLET(S): at 06:18

## 2018-09-10 RX ADMIN — OXYCODONE HYDROCHLORIDE 10 MILLIGRAM(S): 5 TABLET ORAL at 04:23

## 2018-09-10 RX ADMIN — OXYCODONE HYDROCHLORIDE 10 MILLIGRAM(S): 5 TABLET ORAL at 03:15

## 2018-09-10 RX ADMIN — HYDROMORPHONE HYDROCHLORIDE 1 MILLIGRAM(S): 2 INJECTION INTRAMUSCULAR; INTRAVENOUS; SUBCUTANEOUS at 12:53

## 2018-09-10 NOTE — PROGRESS NOTE ADULT - SUBJECTIVE AND OBJECTIVE BOX
24 Hr Events:  Pt pain well controlled. No acute events overnight.     Vital Signs Last 24 Hrs  T(C): 37 (09 Sep 2018 23:41), Max: 37 (09 Sep 2018 15:38)  T(F): 98.6 (09 Sep 2018 23:41), Max: 98.6 (09 Sep 2018 15:38)  HR: 82 (09 Sep 2018 23:41) (81 - 85)  BP: 122/68 (09 Sep 2018 23:41) (107/64 - 129/72)  BP(mean): --  RR: 16 (09 Sep 2018 23:41) (16 - 17)  SpO2: 98% (09 Sep 2018 23:41) (97% - 98%)    Gen: NAD; Resting comfortably  RLE:  Dressing CDI  SILT L2-S1  +EHL/FHL/TA/GSC  2+ DP  Compartments soft and compressible  Mild calf tenderness    A/P: 58F s/p R TKA POD 3:  Analgesia  DVT ppx  WBAT RLE  Encourage incentive spirometry  FU BLLE Dopp  PT/OT  DC planning--home today  Will discuss with attending and advise if plan changes

## 2018-09-16 ENCOUNTER — EMERGENCY (EMERGENCY)
Facility: HOSPITAL | Age: 58
LOS: 1 days | Discharge: HOME | End: 2018-09-16
Attending: EMERGENCY MEDICINE
Payer: MEDICAID

## 2018-09-16 VITALS
OXYGEN SATURATION: 98 % | TEMPERATURE: 98 F | HEART RATE: 78 BPM | HEIGHT: 67 IN | SYSTOLIC BLOOD PRESSURE: 128 MMHG | WEIGHT: 229.94 LBS | DIASTOLIC BLOOD PRESSURE: 71 MMHG | RESPIRATION RATE: 15 BRPM

## 2018-09-16 VITALS
RESPIRATION RATE: 17 BRPM | TEMPERATURE: 98 F | SYSTOLIC BLOOD PRESSURE: 120 MMHG | HEART RATE: 86 BPM | DIASTOLIC BLOOD PRESSURE: 74 MMHG | OXYGEN SATURATION: 100 %

## 2018-09-16 DIAGNOSIS — Z98.890 OTHER SPECIFIED POSTPROCEDURAL STATES: Chronic | ICD-10-CM

## 2018-09-16 DIAGNOSIS — Z90.49 ACQUIRED ABSENCE OF OTHER SPECIFIED PARTS OF DIGESTIVE TRACT: Chronic | ICD-10-CM

## 2018-09-16 DIAGNOSIS — M67.40 GANGLION, UNSPECIFIED SITE: Chronic | ICD-10-CM

## 2018-09-16 DIAGNOSIS — Z96.652 PRESENCE OF LEFT ARTIFICIAL KNEE JOINT: Chronic | ICD-10-CM

## 2018-09-16 LAB
ANION GAP SERPL CALC-SCNC: 5 MMOL/L — SIGNIFICANT CHANGE UP (ref 5–17)
APTT BLD: 29.7 SEC — SIGNIFICANT CHANGE UP (ref 27.5–37.4)
BUN SERPL-MCNC: 15 MG/DL — SIGNIFICANT CHANGE UP (ref 7–23)
CALCIUM SERPL-MCNC: 8.5 MG/DL — SIGNIFICANT CHANGE UP (ref 8.5–10.1)
CHLORIDE SERPL-SCNC: 108 MMOL/L — SIGNIFICANT CHANGE UP (ref 96–108)
CO2 SERPL-SCNC: 31 MMOL/L — SIGNIFICANT CHANGE UP (ref 22–31)
CREAT SERPL-MCNC: 0.55 MG/DL — SIGNIFICANT CHANGE UP (ref 0.5–1.3)
CRP SERPL-MCNC: 5.07 MG/DL — HIGH (ref 0–0.4)
ERYTHROCYTE [SEDIMENTATION RATE] IN BLOOD: 79 MM/HR — HIGH (ref 0–20)
GLUCOSE SERPL-MCNC: 93 MG/DL — SIGNIFICANT CHANGE UP (ref 70–99)
INR BLD: 1.16 RATIO — SIGNIFICANT CHANGE UP (ref 0.88–1.16)
POTASSIUM SERPL-MCNC: 3.5 MMOL/L — SIGNIFICANT CHANGE UP (ref 3.5–5.3)
POTASSIUM SERPL-SCNC: 3.5 MMOL/L — SIGNIFICANT CHANGE UP (ref 3.5–5.3)
PROTHROM AB SERPL-ACNC: 12.7 SEC — SIGNIFICANT CHANGE UP (ref 9.8–12.7)
SODIUM SERPL-SCNC: 144 MMOL/L — SIGNIFICANT CHANGE UP (ref 135–145)

## 2018-09-16 PROCEDURE — 86140 C-REACTIVE PROTEIN: CPT

## 2018-09-16 PROCEDURE — 85027 COMPLETE CBC AUTOMATED: CPT

## 2018-09-16 PROCEDURE — 80048 BASIC METABOLIC PNL TOTAL CA: CPT

## 2018-09-16 PROCEDURE — 93970 EXTREMITY STUDY: CPT

## 2018-09-16 PROCEDURE — 85730 THROMBOPLASTIN TIME PARTIAL: CPT

## 2018-09-16 PROCEDURE — 73562 X-RAY EXAM OF KNEE 3: CPT

## 2018-09-16 PROCEDURE — 73562 X-RAY EXAM OF KNEE 3: CPT | Mod: 26,RT

## 2018-09-16 PROCEDURE — 85652 RBC SED RATE AUTOMATED: CPT

## 2018-09-16 PROCEDURE — 93970 EXTREMITY STUDY: CPT | Mod: 26

## 2018-09-16 PROCEDURE — 99284 EMERGENCY DEPT VISIT MOD MDM: CPT

## 2018-09-16 PROCEDURE — 99284 EMERGENCY DEPT VISIT MOD MDM: CPT | Mod: 25

## 2018-09-16 PROCEDURE — 85610 PROTHROMBIN TIME: CPT

## 2018-09-16 RX ORDER — AZTREONAM 2 G
1 VIAL (EA) INJECTION
Qty: 14 | Refills: 0 | OUTPATIENT
Start: 2018-09-16 | End: 2018-09-22

## 2018-09-16 RX ORDER — OXYCODONE HYDROCHLORIDE 5 MG/1
1 TABLET ORAL
Qty: 12 | Refills: 0 | OUTPATIENT
Start: 2018-09-16 | End: 2018-09-18

## 2018-09-16 RX ORDER — LACTOBACILLUS ACIDOPHILUS 100MM CELL
1 CAPSULE ORAL
Qty: 30 | Refills: 0 | OUTPATIENT
Start: 2018-09-16 | End: 2018-10-15

## 2018-09-16 RX ORDER — AZTREONAM 2 G
1 VIAL (EA) INJECTION
Qty: 20 | Refills: 0 | OUTPATIENT
Start: 2018-09-16 | End: 2018-09-25

## 2018-09-16 RX ORDER — LEVOTHYROXINE SODIUM 125 MCG
0 TABLET ORAL
Qty: 0 | Refills: 0 | COMMUNITY

## 2018-09-16 RX ADMIN — Medication 1 TABLET(S): at 16:28

## 2018-09-16 NOTE — CONSULT NOTE ADULT - SUBJECTIVE AND OBJECTIVE BOX
HPI:  59 yo F w/ PMHx of Obesity, OA, HypoTh s/p R TKA on 9/7/2018, who presents to the ED today with increased pain and serosanguinous drainage of R knee from incision site. Pt denies any traumatic injury or falls. States pain began after PT session on Mon (9/10), but was still able to participate in therapy. Pt states pain involved her entire RLE, mainly around the knee/incision site. Pain continued to worsen, dressing was saturated with blood the following day. Home nurse changed the dressing. Pt was able to work with PT on Fri (9/14). Overall, pain progressively worsened, and now pain with standing as well. Denies numbness or tingling. Pt has been ambulating with walker. Continues to take lovenox BID as prescribed for dvt ppx. Denies fevers, chills, sob, cp, n/v.     PAST MEDICAL & SURGICAL HISTORY:  Obesity (BMI 30-39.9)  Chronic pain of right knee  Primary osteoarthritis of right knee  Hypothyroidism  Personal history of arthritis  Status post left knee replacement: 5/4/2018  S/P cholecystectomy  Ganglion cyst: left wrist 1979  History of arthroscopy of both knees: many yrs ago    Home Medications:  levothyroxine: 225 mcg daily (16 Sep 2018 10:14)    Allergies    codeine (Rash; Urticaria; Vomiting)  penicillin (Rash)    Intolerances    Vital Signs Last 24 Hrs  T(C): 36.9 (16 Sep 2018 10:08), Max: 36.9 (16 Sep 2018 10:08)  T(F): 98.5 (16 Sep 2018 10:08), Max: 98.5 (16 Sep 2018 10:08)  HR: 78 (16 Sep 2018 10:08) (78 - 78)  BP: 128/71 (16 Sep 2018 10:08) (128/71 - 128/71)  BP(mean): --  RR: 15 (16 Sep 2018 10:08) (15 - 15)  SpO2: 98% (16 Sep 2018 10:08) (98% - 98%)    PE:  Gen: Sitting up in bed, NAD  RLE:  Dressing with some areas of serosanguinous uptake  Incision site with staples, healing  Ecchymoses over incision/knee  Ecchymoses over dorsal surface of foot  Area of erythema on anterior surface of distal RLE  Moderate swelling of RLE  +TTP over RLE  PROM 0-70, with pain  Able to SLR  +Calf ttp  Neg log roll/axial loading  +EHl/FHL/Gsc/TA  SILT L2-S1  2+ DP    Secondary Survey:  No head trauma  No ttp over c/t/l/s spine  no ttp along bony prominences diffusely, other than mentioned above  +AROM/PROM of joints intact diffusely, other than mentioned above  SILT diffusely  NVI diffusely  Compartments soft and compressible diffusely HPI:  59 yo F w/ PMHx of Obesity, OA, HypoTh s/p R TKA on 9/7/2018, who presents to the ED today with increased pain and serosanguinous drainage of R knee from incision site. Pt denies any traumatic injury or falls. States pain began after PT session on Mon (9/10), but was still able to participate in therapy. Pt states pain involved her entire RLE, mainly around the knee/incision site. Pain continued to worsen, dressing was saturated with blood the following day. Home nurse changed the dressing. Pt was able to work with PT on Fri (9/14). Overall, pain progressively worsened, and now pain with standing as well. Denies numbness or tingling. Pt has been ambulating with walker. Continues to take lovenox BID as prescribed for dvt ppx. Denies fevers, chills, sob, cp, n/v.     PAST MEDICAL & SURGICAL HISTORY:  Obesity (BMI 30-39.9)  Chronic pain of right knee  Primary osteoarthritis of right knee  Hypothyroidism  Personal history of arthritis  Status post left knee replacement: 5/4/2018  S/P cholecystectomy  Ganglion cyst: left wrist 1979  History of arthroscopy of both knees: many yrs ago    Home Medications:  levothyroxine: 225 mcg daily (16 Sep 2018 10:14)    Allergies    codeine (Rash; Urticaria; Vomiting)  penicillin (Rash)    Intolerances    Vital Signs Last 24 Hrs  T(C): 36.9 (16 Sep 2018 10:08), Max: 36.9 (16 Sep 2018 10:08)  T(F): 98.5 (16 Sep 2018 10:08), Max: 98.5 (16 Sep 2018 10:08)  HR: 78 (16 Sep 2018 10:08) (78 - 78)  BP: 128/71 (16 Sep 2018 10:08) (128/71 - 128/71)  BP(mean): --  RR: 15 (16 Sep 2018 10:08) (15 - 15)  SpO2: 98% (16 Sep 2018 10:08) (98% - 98%)    PE:  Gen: Sitting up in bed, NAD  RLE:  Dressing with some areas of serosanguinous uptake  Incision site with staples, healing  Ecchymoses over incision/knee  Ecchymoses over dorsal surface of foot  Area of erythema on anterior surface of distal RLE/shin, per pt erythema was present since surgery  Moderate swelling of RLE  +TTP over RLE  PROM 0-70, with pain  Able to SLR  +Calf ttp  Neg log roll/axial loading  +EHl/FHL/Gsc/TA  SILT L2-S1  2+ DP    Secondary Survey:  No head trauma  No ttp over c/t/l/s spine  no ttp along bony prominences diffusely, other than mentioned above  +AROM/PROM of joints intact diffusely, other than mentioned above  SILT diffusely  NVI diffusely  Compartments soft and compressible diffusely HPI:  57 yo F w/ PMHx of Obesity, OA, HypoTh s/p R TKA on 9/7/2018, who presents to the ED today with increased pain and serosanguinous drainage of R knee from incision site.  Pt states pain involved her entire RLE, mainly around the knee/incision site. Pt denies any traumatic injury or falls. States pain began after PT session on Mon (9/10), but was still able to participate in therapy. Pain continued to worsen, dressing was saturated with blood the following day. Home nurse changed the dressing. Pt was able to work with PT on Fri (9/14). Overall, pain progressively worsened, and now pain with standing as well. Denies numbness or tingling. Pt has been ambulating with walker. Continues to take lovenox BID as prescribed for dvt ppx. Denies fevers, chills, sob, cp, n/v.     PAST MEDICAL & SURGICAL HISTORY:  Obesity (BMI 30-39.9)  Chronic pain of right knee  Primary osteoarthritis of right knee  Hypothyroidism  Personal history of arthritis  Status post left knee replacement: 5/4/2018  S/P cholecystectomy  Ganglion cyst: left wrist 1979  History of arthroscopy of both knees: many yrs ago    Home Medications:  levothyroxine: 225 mcg daily (16 Sep 2018 10:14)    Allergies    codeine (Rash; Urticaria; Vomiting)  penicillin (Rash)    Intolerances    Vital Signs Last 24 Hrs  T(C): 36.9 (16 Sep 2018 10:08), Max: 36.9 (16 Sep 2018 10:08)  T(F): 98.5 (16 Sep 2018 10:08), Max: 98.5 (16 Sep 2018 10:08)  HR: 78 (16 Sep 2018 10:08) (78 - 78)  BP: 128/71 (16 Sep 2018 10:08) (128/71 - 128/71)  BP(mean): --  RR: 15 (16 Sep 2018 10:08) (15 - 15)  SpO2: 98% (16 Sep 2018 10:08) (98% - 98%)                          9.9    9.56  )-----------( 309      ( 16 Sep 2018 13:16 )             30.7   16 Sep 2018 13:13    144    |  108    |  15     ----------------------------<  93     3.5     |  31     |  0.55     Ca    8.5        16 Sep 2018 13:13    ESR: 79    PE:  Gen: Sitting up in bed, NAD  RLE:  Dressing with some areas of serosanguinous uptake  Incision site with staples, healing  Ecchymoses over incision/knee  Ecchymoses over dorsal surface of foot  Area of erythema on anterior surface of distal RLE/shin (distal to TKA incision)  Moderate swelling of RLE  +TTP over RLE  PROM 0-70, with pain  Able to SLR  +Calf ttp  Neg log roll/axial loading  +EHl/FHL/Gsc/TA  SILT L2-S1  2+ DP    Secondary Survey:  No head trauma  No ttp over c/t/l/s spine  no ttp along bony prominences diffusely, other than mentioned above  +AROM/PROM of joints intact diffusely, other than mentioned above  SILT diffusely  NVI diffusely  Compartments soft and compressible diffusely

## 2018-09-16 NOTE — CONSULT NOTE ADULT - ASSESSMENT
A/P:  57 yo F s/p R TKA (9/7) presents to ED with R knee pain/swelling/serosanguinous drainage:  -possibly secondary to overuse/excessive use  -FU US Dopp RLE  -FU XR R Knee  -dvt ppx  -pain control  -WBAT/PT/OT  -will discuss with attending and update plan if any changes A/P:  59 yo F s/p R TKA (9/7) presents to ED with R knee pain/swelling/serosanguinous drainage:  -Most likely secondary to overuse/excessive physical therapy and cellulitis  -Pt afebrile, no elevated WBC, draining serosanguinous fluid 2/2 lovenox, no clinical indication of septic/joint arthritis  -XR R Knee demonstrates no evidence of any acute fx  -US Dopp BLLE Neg  -dvt ppx  -pain control  -WBAT/PT/OT  -no acute orthopedic surgical intervention indicated at this time  -ortho stable for DC  -FU outpt with Dr. Sherwood on Thursday (9/20), appt already made  -will discuss with attending and update plan if any changes A/P:  57 yo F s/p R TKA (9/7) with R knee pain/swelling/drainage and pretibial cellulitis   -Knee symptoms likely 2/2 to overuse/excessive physical therapy   -Pt afebrile, no elevated WBC, draining serosanguinous fluid 2/2 lovenox, no clinical indication of septic/joint arthritis at this time, ESR elevated 2/2 recent surgical procedure  -XR R Knee demonstrates no evidence of any acute fx  -US Dopp BLLE Negative for DVT  -dvt ppx  -pain control  -WBAT/PT/OT  -no acute orthopedic surgical intervention indicated at this time  -DC home with PO Abx for cellulitis (Bactrim DS BID x10 days)  -ortho stable for DC  -FU outpt with Dr. Sherwood on Thursday (9/20), appt already made  -will discuss with attending and update plan if any changes

## 2018-09-16 NOTE — ED PROVIDER NOTE - OBJECTIVE STATEMENT
s/p right knee replacement 9/7/18 by Lindsay.  leland arevalo this past Tuesday and again today.  Referred by her ortho.  no fever, chill.   right knee pain worsens today. s/p right knee replacement 9/7/18 by Lindsay.  puramy arevalo this past Tuesday and again today.  Referred by her ortho.  no fever, chill.   right knee pain worsens today. no other complaint.

## 2018-09-16 NOTE — ED PROVIDER NOTE - PROGRESS NOTE DETAILS
All results were explained to patient and/or family and a copy of all available results given.  pt evaluated by ortho, wound dc was serosanguinous.  pt already bandage and evaluated by ortho, deferred  wound exam by me.  dc home on Bactrim DS

## 2018-09-16 NOTE — ED ADULT NURSE NOTE - NSIMPLEMENTINTERV_GEN_ALL_ED
Implemented All Fall Risk Interventions:  [place] to call system. Call bell, personal items and telephone within reach. Instruct patient to call for assistance. Room bathroom lighting operational. Non-slip footwear when patient is off stretcher. Physically safe environment: no spills, clutter or unnecessary equipment. Stretcher in lowest position, wheels locked, appropriate side rails in place. Provide visual cue, wrist band, yellow gown, etc. Monitor gait and stability. Monitor for mental status changes and reorient to person, place, and time. Review medications for side effects contributing to fall risk. Reinforce activity limits and safety measures with patient and family.

## 2018-09-16 NOTE — ED ADULT TRIAGE NOTE - CHIEF COMPLAINT QUOTE
right knee replacement 9/7 by Presley, wound care completed by home RN after discharge. patient presents today complaining of increased pain, redness and bloody drainage of the right lower leg. denies fever/chills

## 2018-09-16 NOTE — ED ADULT NURSE NOTE - OBJECTIVE STATEMENT
Pt received in bed alert and oriented and resting in bed with c/o increased pain, redness and bloody drainage of the right lower leg. denies fever/chills. As per Md's orders IV geneva placed blood specimen obtained and sent to the lab. Nursing care ongoing and safety maintained.

## 2019-06-24 NOTE — ED ADULT NURSE NOTE - NURSING SKIN WOUND APPEAR #1
Note Text (......Xxx Chief Complaint.): This diagnosis correlates with the
Other (Free Text): call [facility] cancelled 1st prescription. sent to [facility]
Detail Level: Zone
purulent drainage/serosanguinous drainage

## 2019-12-06 NOTE — PATIENT PROFILE ADULT. - PRO SERVICES AT DISCH
No pertinent family history in first degree relatives
[All Other ROS] : all other reviewed systems are negative
none/unsure

## 2020-01-01 NOTE — DISCHARGE NOTE ADULT - CARE PROVIDER_API CALL
Esau Sherwood (DO), Orthopaedic Surgery  [address] [place] [address]  Phone: [phone]  Fax: [phone] Kat

## 2020-05-18 NOTE — PROGRESS NOTE ADULT - PROVIDER SPECIALTY LIST ADULT
Anesthesia [facility]
[address]
[place]   [address]                     PATHOLOGY RPT PROCEDURE       
_______________________________________________________________________________
 
Name:       FINN VILLANUEVA                   Room #:                     REG JOSEFA 
MSHANTEL.#:      [id number]     Account #:      [id number]  
Admission:  05/15/20    Date of Birth:  [birth year]  
Discharge:                                              Report #:    [id number]
                                                        Path Case #: [id number]
_______________________________________________________________________________
 
[facility] Accession Number: [id number]
.                                                                01
Material submitted:                                        .
esophagus - BX OF DISTAL ESOPHAGUS. Modifiers: distal
.                                                                01
Clinical history:                                          .
None provided
.                                                                02
**********************************************************************
Diagnosis:
Gastroesophageal mucosa, distal esophagus to R/O Tejeda's esophagus,
endoscopic biopsy:
- Gastric cardia-type mucosa associated with focal early goblet cell
change; no definite intestinal metaplasia (Tejeda's metaplasia) present.
- Moderate active esophagitis within squamous mucosa.
- Negative for dysplasia.
(I[facility]:liliya; 05/18/2020)
QMS  05/18/2020  1336 Local
**********************************************************************
.                                                                02
Electronically signed:                                     .
Simran Garland MD, Pathologist
NPI- [id number]
.                                                                01
Gross description:                                         .
Received in formalin labeled "Finn Villanueva BX of distal esophagus to rule
out Tejeda's" is a 0.5 x 0.4 x 0.1 cm aggregate of tan-brown soft tissue
fragments. The specimen is submitted entirely in A1. ([facility]; 5/17/2020)
[facility]  05/17/2020  1219 Local
.                                                                02
Pathologist provided ICD-10:
K20.9
.                                                                02
CPT                                                        .
126330
Specimen Comment: A courtesy copy of this report has been sent to [phone],
[phone]
Specimen Comment: [id number]
Specimen Comment: Report sent to  / DR ROWAN
***Performed at:  01
   [address], [place]  [id number]
   MD Lionel Livingston MD Phone:  [phone]
***Performed at:  02
   [address]  [id number]
   MD Simran Garland MD Phone:  [phone]

## 2020-08-10 NOTE — ASU PREOP CHECKLIST - INTERNAL PROSTHESES
Treatment Number: 1 yes(specify)/left knee replaced Fluence #1 (J/Cm2 Or Mj/Cm2): 5000 mJ Fluence Units: J/cm2 Location #3: L. Inner thigh Location #1: Perioral Location #4: underarms Detail Level: Generalized Location #2: Eveleen Beam thigh Billin (Total area less than 250 cm2) Post-Care Instructions: I reviewed with the patient in detail post-care instructions. Patient should stay away from the sun and wear sun protection until treated areas are fully healed. Total Square Area In Cm2 (Optional): [address] Mode: repeat paint Spot Size: 2 x 2 cm Consent: Written consent obtained, risks reviewed including but not limited to crusting, scabbing, blistering, scarring, darker or lighter pigmentary change, incidental hair removal, bruising, and/or incomplete removal.

## 2020-10-09 NOTE — ED ADULT NURSE NOTE - NURSING ED SKIN COLOR
Add 27792 Cpt? (Important Note: In 2017 The Use Of 52505 Is Being Tracked By Cms To Determine Future Global Period Reimbursement For Global Periods): no
Detail Level: Detailed
normal for race

## 2021-05-17 NOTE — DISCHARGE NOTE ADULT - NS MD DC FALL RISK RISK
Date: 5/17/2021    Time of Call: 10:54 AM     Diagnosis:  Severe aortic stenosis     [ TORB ] Ordering provider: Carmel Weston CNP  Order:  1. Labs- CBC, CMP, INR, Type and Screen, Covid.  2. TAVR pre procedure order set  3. Blood componenets   Order received by: Bianka Sutherland RN     Follow-up/additional notes:        For information on Fall & Injury Prevention, visit [email or web address]

## 2021-10-26 NOTE — H&P PST ADULT - GENERAL
Patient Name: Jaylyn Fuentes  : 1951    MRN: [id number]                              Today's Date: 10/26/2021       Admit Date: 10/22/2021    Visit Dx:     ICD-10-CM ICD-9-CM   1. Frequent falls  R29.6 V15.88   2. Altered mental status, unspecified altered mental status type  R41.82 780.97   3. Pulmonary nodule seen on imaging study  R91.1 793.11     Patient Active Problem List   Diagnosis   • Osteoarthrosis, localized, primary, shoulder region   • Hypertension   • Depressed   • Status post total replacement of left shoulder   • Duodenitis   • Abnormal CT scan, small bowel   • Nausea and vomiting   • Observed sleep apnea   • Snoring   • Hypersomnia   • Non-restorative sleep   • Class 2 obesity   • Poor memory   • Frequent falls     Past Medical History:   Diagnosis Date   • Abdominal pain    • Abnormal CT of the abdomen    • Abnormal glucose    • Abnormal liver enzymes    • Abnormal urinalysis    • Achilles tendinitis     left   • Acid reflux disease    • Acromioclavicular joint arthritis    • Acromioclavicular joint pain, bilateral    • Aftercare following knee joint replacement surgery    • Alcohol abuse    • Alternating constipation and diarrhea    • Anemia    • Arthralgia of multiple sites    • Arthritis    • BMI 34.0-34.9,adult    • Carpal tunnel syndrome    • Cataract    • Cellulitis of extremity    • Chronic insomnia    • Chronic kidney disease, stage III (moderate) (HCC)    • Contact dermatitis    • Contusion of bone    • Cough    • Decreased range of motion    • Degeneration of cervical intervertebral disc    • Depressed    • Dermatitis    • Diarrhea    • Dry eye    • Dysphagia    • Edema of extremities    • Esophagitis    • Fatigue    • Flu vaccine need    • GERD (gastroesophageal reflux disease)    • Glenohumeral arthritis, left    • Hemorrhoids    • Hiatal hernia    • Hip pain    • History of colonic polyps    • History of DVT in adulthood    • History of transfusion    • Hyperactivity of  bladder    • Hyperlipidemia    • Hypertension    • Hypokalemia    • Irritable bowel syndrome with diarrhea    • Lactose intolerance    • Left ankle pain    • Leg cramps    • Leg swelling    • Limitation of joint motion of ankle, left    • Lower extremity tendinopathy    • Medicare annual wellness visit, subsequent    • Methicillin resistant Staphylococcus aureus infection    • Microscopic hematuria    • Migraine    • Obstructive sleep apnea    • Osteoarthritis, chronic    • Pain, foot, left, chronic    • Pancreatitis, chronic (HCC)    • Pes planus    • PONV (postoperative nausea and vomiting)     SCOPOLAMINE HELPS-USED DURING LAST SURGERY    • Rectal leakage    • Retrocalcaneal bursitis    • Right ankle instability    • Right ankle pain    • Right ankle sprain    • Right ankle swelling    • Rotator cuff tear, left    • S/P shoulder surgery    • Shoulder region pain    • Spinal stenosis    • Swallowing difficulty    • Urge incontinence of urine    • Urinary incontinence    • Visit for screening mammogram    • Wears contact lenses    • Wears dentures     UPPER AND LOWER    • Wears hearing aid     DOESN'T HAVE WITH HER   • Weight gain      Past Surgical History:   Procedure Laterality Date   • BACK SURGERY      LUMBAR FUSION    • CERVICAL FUSION      plates, rods, and screws   • CHOLECYSTECTOMY     • COLONOSCOPY      2 YEARS AGO    • ENDOSCOPY N/A 10/14/2021    Procedure: ESOPHAGOGASTRODUODENOSCOPY with biopsy and polypectomy;  Surgeon: Hussain Basilio MD;  [place] [facility] [place]  Service: Gastroenterology;  Laterality: N/A;  gastritis and polyps   • EYE SURGERY      LASER FOR KERITONOSIS    • GALLBLADDER SURGERY     • HERNIA REPAIR     • HYSTERECTOMY     • INGUINAL HERNIA REPAIR     • JOINT REPLACEMENT      BILATERAL KNEES, BILATERAL HIPS   • NECK SURGERY     • ID RECONSTR TOTAL SHOULDER IMPLANT Left 2/13/2017    Procedure: TOTAL SHOULDER REPLACEMENT LEFT;  Surgeon: Benigno Chavez MD;  Location: [facility];  Service: Orthopedics   • REPLACEMENT TOTAL KNEE     • SHOULDER LIGAMENT REPAIR Right    • TONSILLECTOMY     • TOTAL HIP ARTHROPLASTY     • UPPER GASTROINTESTINAL ENDOSCOPY        General Information     Row Name 10/26/21 1012          Physical Therapy Time and Intention    Document Type therapy note (daily note)  -CB     Mode of Treatment individual therapy; physical therapy  -CB     Row Name 10/26/21 1012          General Information    Patient Profile Reviewed yes  -CB     Existing Precautions/Restrictions fall  -CB     Row Name 10/26/21 1012          Cognition    Orientation Status (Cognition) oriented to; person; situation  confusion noted throughout  -CB     Row Name 10/26/21 1012          Safety Issues, Functional Mobility    Safety Issues Affecting Function (Mobility) insight into deficits/self-awareness; positioning of assistive device; judgment; awareness of need for assistance; problem-solving  -CB     Impairments Affecting Function (Mobility) endurance/activity tolerance  -CB     Comment, Safety Issues/Impairments (Mobility) gait belt and non skid socks, decreased safety awareness  -CB           User Key  (r) = Recorded By, (t) = Taken By, (c) = Cosigned By    Initials Name Provider Type    CB Patricia Em, PT Physical Therapist               Mobility     Row Name 10/26/21 1013          Bed Mobility    Bed Mobility supine-sit  -CB     Supine-Sit [place] (Bed Mobility) standby assist  -CB     Assistive Device (Bed Mobility) bed rails; head of bed elevated  -CB     Row Name 10/26/21 1013          Sit-Stand Transfer    Sit-Stand [place] (Transfers) contact guard; verbal cues  -CB     Assistive Device (Sit-Stand Transfers) walker, front-wheeled  -CB     Row Name 10/26/21 1013          Gait/Stairs (Locomotion)    [place] Level (Gait) contact guard; verbal cues  -CB     Assistive Device (Gait) walker, front-wheeled  -CB     Distance in Feet (Gait) 175ft  -CB     Deviations/Abnormal  Patterns (Gait) stride length decreased  -CB     Bilateral Gait Deviations forward flexed posture  -CB     Comment (Gait/Stairs) decreased safety awareness during ambulation and cues for safety using rwx. Pt tends to lift rolling walker.  -CB           User Key  (r) = Recorded By, (t) = Taken By, (c) = Cosigned By    Initials Name Provider Type    Patricia Ding PT Physical Therapist               Obj/Interventions     Row Name 10/26/21 1014          Motor Skills    Therapeutic Exercise --  seated DF/PF and hip abd x10 reps  -CB     Row Name 10/26/21 1014          Balance    Balance Assessment sitting static balance; sitting dynamic balance; standing static balance; standing dynamic balance  -CB     Static Sitting Balance WFL; unsupported; sitting, edge of bed  -CB     Dynamic Sitting Balance WFL; unsupported; sitting, edge of bed  -CB     Static Standing Balance WFL; supported; standing  -CB     Dynamic Standing Balance WFL; supported; standing  -CB     Balance Interventions sitting; standing; sit to stand; supported; static; dynamic  -CB           User Key  (r) = Recorded By, (t) = Taken By, (c) = Cosigned By    Initials Name Provider Type    Patricia Ding PT Physical Therapist               Goals/Plan    No documentation.                Clinical Impression     Row Name 10/26/21 1014          Pain    Additional Documentation Pain Scale: Numbers Pre/Post-Treatment (Group)  -CB     Row Name 10/26/21 1014          Pain Scale: Numbers Pre/Post-Treatment    Pretreatment Pain Rating 0/10 - no pain  -CB     Posttreatment Pain Rating 0/10 - no pain  -CB     Row Name 10/26/21 1014          Plan of Care Review    Plan of Care Reviewed With patient  -CB     Progress improving  -CB     Outcome Summary Patient is agreeable to PT this AM. Pt is pleasantly confused throughout session. Pt completed bed mobility with SBA and STS to rwx requiring CGA. Pt increased ambulation distance to 175ft using rwx requiring CGA. No  LOB but decreased safety awareness throughout with cues required. Pt encouraged to ambulate in hallway with [facility] staff. Pt will continue to benefit from skilled PT to address functional deficits.  -CB     Row Name 10/26/21 1014          Positioning and Restraints    Pre-Treatment Position in bed  -CB     Post Treatment Position chair  -CB     In Chair notified nsg; reclined; call light within reach; exit alarm on; encouraged to call for assist  -CB           User Key  (r) = Recorded By, (t) = Taken By, (c) = Cosigned By    Initials Name Provider Type    Patricia Ding, PT Physical Therapist               Outcome Measures     Row Name 10/26/21 1016          How much help from another person do you currently need...    Turning from your back to your side while in flat bed without using bedrails? 3  -CB     Moving from lying on back to sitting on the side of a flat bed without bedrails? 3  -CB     Moving to and from a bed to a chair (including a wheelchair)? 3  -CB     Standing up from a chair using your arms (e.g., wheelchair, bedside chair)? 3  -CB     Climbing 3-5 steps with a railing? 2  -CB     To walk in hospital room? 3  -CB     AM-PAC 6 Clicks Score (PT) 17  -CB     Row Name 10/26/21 1016          Functional Assessment    Outcome Measure Options AM-PAC 6 Clicks Basic Mobility (PT)  -CB           User Key  (r) = Recorded By, (t) = Taken By, (c) = Cosigned By    Initials Name Provider Type    Patricia Ding, PT Physical Therapist                             Physical Therapy Education                 Title: PT OT SLP Therapies (In Progress)     Topic: Physical Therapy (Done)     Point: Mobility training (Done)     Learning Progress Summary           Patient Acceptance, E,TB,D, VU,NR by MARIE at 10/26/2021 1017    Acceptance, E, VU,NR by JF at 10/24/2021 1432                   Point: Home exercise program (Done)     Learning Progress Summary           Patient Acceptance, E,TB,D, VU,NR by MARIE at 10/26/2021 1017                    Point: Body mechanics (Done)     Learning Progress Summary           Patient Acceptance, E,TB,D, VU,NR by CB at 10/26/2021 1017    Acceptance, E, VU,NR by DJ at 10/24/2021 1432                   Point: Precautions (Done)     Learning Progress Summary           Patient Acceptance, E,TB,D, VU,NR by CB at 10/26/2021 1017    Acceptance, E, VU,NR by  at 10/24/2021 1432                               User Key     Initials Effective Dates Name Provider Type Discipline    DJ 10/25/19 -  Roopa De Jesus, PT Physical Therapist PT    CB 10/22/21 -  Patricia Em PT Physical Therapist PT              PT Recommendation and Plan     Plan of Care Reviewed With: patient  Progress: improving  Outcome Summary: Patient is agreeable to PT this AM. Pt is pleasantly confused throughout session. Pt completed bed mobility with SBA and STS to rwx requiring CGA. Pt increased ambulation distance to 175ft using rwx requiring CGA. No LOB but decreased safety awareness throughout with cues required. Pt encouraged to ambulate in hallway with nsg staff. Pt will continue to benefit from skilled PT to address functional deficits.     Time Calculation:    PT Charges     Row Name 10/26/21 1017             Time Calculation    Start Time 0932  -CB      Stop Time 0950  -CB      Time Calculation (min) 18 min  -CB      PT Received On 10/26/21  -CB      PT - Next Appointment 10/27/21  -CB              Time Calculation- PT    Total Timed Code Minutes- PT 18 minute(s)  -CB              Timed Charges    85121 - PT Therapeutic Activity Minutes 18  -CB              Total Minutes    Timed Charges Total Minutes 18  -CB       Total Minutes 18  -CB            User Key  (r) = Recorded By, (t) = Taken By, (c) = Cosigned By    Initials Name Provider Type    CB Patricia Em, PT Physical Therapist              Therapy Charges for Today     Code Description Service Date Service Provider Modifiers Qty    11955891270  PT THERAPEUTIC ACT EA 15 MIN  10/26/2021 Patricia Em, PT GP 1          PT G-Codes  Outcome Measure Options: AM-PAC 6 Clicks Basic Mobility (PT)  AM-PAC 6 Clicks Score (PT): 17    Patricia Em, PT  10/26/2021     negative

## 2022-08-19 NOTE — H&P PST ADULT - MALLAMPATI CLASS
Esophagogastroduodenoscopy Procedure Note      Place of Service: [facility]    Patient Name: Lona Hi    MRN# [id number]    Date of Procedure: 8/19/2022    Surgeon: Kahlil Nguyen MD    Referring Physician: Kahlil Nguyen MD    Primary Care Provider: Cherelle Orellana DO    Operative Procedure: EGD - esophagogastroduodenoscopy    Preoperative Diagnosis: Post laparoscopic hiatal hernia and transoral incision less fundoplication evaluation history of versus right upper quadrant discomfort.    Postoperative Diagnosis:  See impression below    Anesthesia Medications administered:  Monitored anesthesia care.Moderate sedation was administered with continuous monitoring of the patient by Anesthesia provider .    Please refer to anesthesia documentation for doses of medications administered intravenously as well as the patient’s status during the procedure.     Estimated Blood Loss:  Minimal    Complications: No immediate complications    Endoscope: Olympus      Procedure Description:   Informed consent was obtained.  Procedure, risks, alternatives were discussed.  Bleeding, perforation, need for emergency surgery and other imponderables were discussed.  Patient understood and accepted the risks and requested the procedure.  Procedure was performed in the GI lab.    The patient was placed in the left lateral position and monitored continuously with automatic blood pressure, ECG tracing, pulse oximetry monitoring and direct observations. Bite block placed and oxygen administered by a nasal cannula as needed. Medications were administered incrementally over the course of the procedure to achieve an adequate level of conscious sedation. After adequate sedation, the endoscope was carefully introduced into the oropharynx and passed into the esophagus. The esophagus and GE junction were carefully examined. After advancement of the endoscope into the stomach, a careful examination was performed,  including views of the antrum, incisura angularis, corpus and retroflexed views of the cardia and fundus.?   The pylorus was then intubated without any difficulty and the endoscope was advanced to the second portion of the duodenum. Careful examination of the second portion of the duodenum and the bulb was then performed. Findings and intervention are detailed below. The stomach was then decompressed and the endoscope withdrawn.  Overall, the patient tolerated the procedure well without undue discomfort, hypotension or desaturation. The patient recovered in the observation area and was discharged when adequately recovered.?      Findings:   Esophagus:   The proximal mid and distal esophagus were well visualized.  Esophagus appeared normal.  GE junction was at 40 cm.  Retroflexion was performed.  Which showed that previously performed laparoscopic hiatal hernia repair had resulted in complete resolution of the hiatal hernia.  Transoral incision less fundoplication clips were noted to be in place with a good 3 cm omega and with air insufflation Hill valve was 1    Stomach:  The stomach was examined in direct and retroflexed views.  The fundus, body, angulus, pylorus were all carefully inspected.  Retroflexion revealed presence of fundoplication.  Normal-appearing fundus body and antrum.  No significant gastric retention noted.  No evidence of gastritis or gastroparesis.    Duodenum/small bowel:   The duodenal bulb and descending duodenum were examined.  Normal to second part of duodenum.      Impression: Normal appearance following laparoscopic hiatal hernia repair and transoral incision less fundoplication      Recommendations:   1. Continue current diet.  2. Weight reduction.  3. Maintain follow up with PCP as per previously established routine.      Kahlil Nguyen MD [facility]  FASGE  8/19/2022    Class II - visualization of the soft palate, fauces, and uvula

## 2022-10-04 NOTE — PATIENT PROFILE ADULT. - SOCIAL CONCERNS
Bedside shift change report given to Diana John rn  (oncoming nurse) by MISTY MARES  (offgoing nurse). Report included the following information SBAR. None

## 2023-02-10 NOTE — ED ADULT NURSE NOTE - CHPI ED NUR CONTEXT2
unknown Cantharidin Pregnancy And Lactation Text: The use of this medication during pregnancy or lactation is not recommended as there is insufficient data.

## 2023-08-21 NOTE — PHYSICAL THERAPY INITIAL EVALUATION ADULT - ACTIVE RANGE OF MOTION EXAMINATION, REHAB EVAL
Right knee flexion 0-65 Hydroquinone Counseling:  Patient advised that medication may result in skin irritation, lightening (hypopigmentation), dryness, and burning.  In the event of skin irritation, the patient was advised to reduce the amount of the drug applied or use it less frequently.  Rarely, spots that are treated with hydroquinone can become darker (pseudoochronosis).  Should this occur, patient instructed to stop medication and call the office. The patient verbalized understanding of the proper use and possible adverse effects of hydroquinone.  All of the patient's questions and concerns were addressed.

## 2024-06-05 NOTE — H&P PST ADULT - BP NONINVASIVE SYSTOLIC (MM HG)
116